# Patient Record
Sex: MALE | Race: WHITE | NOT HISPANIC OR LATINO | Employment: OTHER | URBAN - METROPOLITAN AREA
[De-identification: names, ages, dates, MRNs, and addresses within clinical notes are randomized per-mention and may not be internally consistent; named-entity substitution may affect disease eponyms.]

---

## 2023-04-24 ENCOUNTER — HOSPITAL ENCOUNTER (OUTPATIENT)
Dept: DATA CONVERSION | Facility: HOSPITAL | Age: 53
End: 2023-04-24
Attending: ANESTHESIOLOGY
Payer: COMMERCIAL

## 2023-04-24 DIAGNOSIS — I10 ESSENTIAL (PRIMARY) HYPERTENSION: ICD-10-CM

## 2023-04-24 DIAGNOSIS — Z96.651 PRESENCE OF RIGHT ARTIFICIAL KNEE JOINT: ICD-10-CM

## 2023-04-24 DIAGNOSIS — M54.16 RADICULOPATHY, LUMBAR REGION: ICD-10-CM

## 2023-04-24 DIAGNOSIS — Z90.49 ACQUIRED ABSENCE OF OTHER SPECIFIED PARTS OF DIGESTIVE TRACT: ICD-10-CM

## 2023-04-24 DIAGNOSIS — Z96.642 PRESENCE OF LEFT ARTIFICIAL HIP JOINT: ICD-10-CM

## 2023-04-24 DIAGNOSIS — M48.061 SPINAL STENOSIS, LUMBAR REGION WITHOUT NEUROGENIC CLAUDICATION: ICD-10-CM

## 2023-05-08 ENCOUNTER — HOSPITAL ENCOUNTER (OUTPATIENT)
Dept: DATA CONVERSION | Facility: HOSPITAL | Age: 53
End: 2023-05-08
Attending: ANESTHESIOLOGY | Admitting: ANESTHESIOLOGY
Payer: COMMERCIAL

## 2023-05-08 DIAGNOSIS — M16.11 UNILATERAL PRIMARY OSTEOARTHRITIS, RIGHT HIP: ICD-10-CM

## 2023-05-08 DIAGNOSIS — Z90.49 ACQUIRED ABSENCE OF OTHER SPECIFIED PARTS OF DIGESTIVE TRACT: ICD-10-CM

## 2023-05-08 DIAGNOSIS — M25.551 PAIN IN RIGHT HIP: ICD-10-CM

## 2023-05-08 DIAGNOSIS — Z96.642 PRESENCE OF LEFT ARTIFICIAL HIP JOINT: ICD-10-CM

## 2023-05-08 DIAGNOSIS — I10 ESSENTIAL (PRIMARY) HYPERTENSION: ICD-10-CM

## 2023-05-08 DIAGNOSIS — Z96.651 PRESENCE OF RIGHT ARTIFICIAL KNEE JOINT: ICD-10-CM

## 2023-09-01 ENCOUNTER — OFFICE VISIT (OUTPATIENT)
Dept: PRIMARY CARE | Facility: CLINIC | Age: 53
End: 2023-09-01
Payer: COMMERCIAL

## 2023-09-01 VITALS
HEART RATE: 69 BPM | WEIGHT: 315 LBS | SYSTOLIC BLOOD PRESSURE: 112 MMHG | BODY MASS INDEX: 45.1 KG/M2 | HEIGHT: 70 IN | DIASTOLIC BLOOD PRESSURE: 75 MMHG

## 2023-09-01 DIAGNOSIS — M25.521 RIGHT ELBOW PAIN: Primary | ICD-10-CM

## 2023-09-01 DIAGNOSIS — Z13.220 LIPID SCREENING: ICD-10-CM

## 2023-09-01 DIAGNOSIS — E55.9 VITAMIN D DEFICIENCY: ICD-10-CM

## 2023-09-01 DIAGNOSIS — E66.01 CLASS 3 SEVERE OBESITY WITH BODY MASS INDEX (BMI) OF 45.0 TO 49.9 IN ADULT, UNSPECIFIED OBESITY TYPE, UNSPECIFIED WHETHER SERIOUS COMORBIDITY PRESENT (MULTI): ICD-10-CM

## 2023-09-01 LAB
ALANINE AMINOTRANSFERASE (SGPT) (U/L) IN SER/PLAS: 40 U/L (ref 10–52)
ALBUMIN (G/DL) IN SER/PLAS: 4.1 G/DL (ref 3.4–5)
ALKALINE PHOSPHATASE (U/L) IN SER/PLAS: 140 U/L (ref 33–120)
ANION GAP IN SER/PLAS: 14 MMOL/L (ref 10–20)
ASPARTATE AMINOTRANSFERASE (SGOT) (U/L) IN SER/PLAS: 31 U/L (ref 9–39)
BILIRUBIN TOTAL (MG/DL) IN SER/PLAS: 0.5 MG/DL (ref 0–1.2)
CALCIUM (MG/DL) IN SER/PLAS: 9.4 MG/DL (ref 8.6–10.6)
CARBON DIOXIDE, TOTAL (MMOL/L) IN SER/PLAS: 24 MMOL/L (ref 21–32)
CHLORIDE (MMOL/L) IN SER/PLAS: 107 MMOL/L (ref 98–107)
CHOLESTEROL (MG/DL) IN SER/PLAS: 134 MG/DL (ref 0–199)
CHOLESTEROL IN HDL (MG/DL) IN SER/PLAS: 42.6 MG/DL
CHOLESTEROL/HDL RATIO: 3.1
CREATININE (MG/DL) IN SER/PLAS: 0.86 MG/DL (ref 0.5–1.3)
ERYTHROCYTE DISTRIBUTION WIDTH (RATIO) BY AUTOMATED COUNT: 13.3 % (ref 11.5–14.5)
ERYTHROCYTE MEAN CORPUSCULAR HEMOGLOBIN CONCENTRATION (G/DL) BY AUTOMATED: 31 G/DL (ref 32–36)
ERYTHROCYTE MEAN CORPUSCULAR VOLUME (FL) BY AUTOMATED COUNT: 99 FL (ref 80–100)
ERYTHROCYTES (10*6/UL) IN BLOOD BY AUTOMATED COUNT: 4.31 X10E12/L (ref 4.5–5.9)
ESTIMATED AVERAGE GLUCOSE FOR HBA1C: 108 MG/DL
GFR MALE: >90 ML/MIN/1.73M2
GLUCOSE (MG/DL) IN SER/PLAS: 95 MG/DL (ref 74–99)
HEMATOCRIT (%) IN BLOOD BY AUTOMATED COUNT: 42.6 % (ref 41–52)
HEMOGLOBIN (G/DL) IN BLOOD: 13.2 G/DL (ref 13.5–17.5)
HEMOGLOBIN A1C/HEMOGLOBIN TOTAL IN BLOOD: 5.4 %
LDL: 74 MG/DL (ref 0–99)
LEUKOCYTES (10*3/UL) IN BLOOD BY AUTOMATED COUNT: 6 X10E9/L (ref 4.4–11.3)
NRBC (PER 100 WBCS) BY AUTOMATED COUNT: 0 /100 WBC (ref 0–0)
PLATELETS (10*3/UL) IN BLOOD AUTOMATED COUNT: 241 X10E9/L (ref 150–450)
POTASSIUM (MMOL/L) IN SER/PLAS: 4.1 MMOL/L (ref 3.5–5.3)
PROTEIN TOTAL: 7.1 G/DL (ref 6.4–8.2)
SODIUM (MMOL/L) IN SER/PLAS: 141 MMOL/L (ref 136–145)
TRIGLYCERIDE (MG/DL) IN SER/PLAS: 87 MG/DL (ref 0–149)
URATE (MG/DL) IN SER/PLAS: 7.3 MG/DL (ref 4–7.5)
UREA NITROGEN (MG/DL) IN SER/PLAS: 13 MG/DL (ref 6–23)
VLDL: 17 MG/DL (ref 0–40)

## 2023-09-01 PROCEDURE — 80061 LIPID PANEL: CPT

## 2023-09-01 PROCEDURE — 80053 COMPREHEN METABOLIC PANEL: CPT

## 2023-09-01 PROCEDURE — 84550 ASSAY OF BLOOD/URIC ACID: CPT

## 2023-09-01 PROCEDURE — 3008F BODY MASS INDEX DOCD: CPT | Performed by: STUDENT IN AN ORGANIZED HEALTH CARE EDUCATION/TRAINING PROGRAM

## 2023-09-01 PROCEDURE — 85027 COMPLETE CBC AUTOMATED: CPT

## 2023-09-01 PROCEDURE — 99214 OFFICE O/P EST MOD 30 MIN: CPT | Performed by: STUDENT IN AN ORGANIZED HEALTH CARE EDUCATION/TRAINING PROGRAM

## 2023-09-01 PROCEDURE — 83036 HEMOGLOBIN GLYCOSYLATED A1C: CPT

## 2023-09-01 PROCEDURE — 82306 VITAMIN D 25 HYDROXY: CPT

## 2023-09-01 PROCEDURE — 1036F TOBACCO NON-USER: CPT | Performed by: STUDENT IN AN ORGANIZED HEALTH CARE EDUCATION/TRAINING PROGRAM

## 2023-09-01 PROCEDURE — 3074F SYST BP LT 130 MM HG: CPT | Performed by: STUDENT IN AN ORGANIZED HEALTH CARE EDUCATION/TRAINING PROGRAM

## 2023-09-01 PROCEDURE — 3078F DIAST BP <80 MM HG: CPT | Performed by: STUDENT IN AN ORGANIZED HEALTH CARE EDUCATION/TRAINING PROGRAM

## 2023-09-01 RX ORDER — METHYLPREDNISOLONE 4 MG/1
TABLET ORAL
Qty: 21 TABLET | Refills: 0 | Status: SHIPPED
Start: 2023-09-01 | End: 2023-09-01 | Stop reason: ENTERED-IN-ERROR

## 2023-09-01 NOTE — PROGRESS NOTES
"Subjective   Patient ID: Surendra Maher is a 53 y.o. male who presents for Arm Pain.  Pt stated that last Friday night he noticed that his elbow was swollen and tender but does not remember it being warm to the touch. tates that on Friday the pain was shooting up from his hand up into his elbow. The pain was so severe that he was not able to sleep at all on Friday. He also noticed that he did not have any strength in that arm. Saturday it continued to be swollen and after taking ibuprofen the pain improved. Denies any recent trauma. Denies fever and chills. On Sunday the patient went to [place] and denies any x-rays being done and was never given any prescription.  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   Pt has a PMH of Bilateral Carpal tunnel surgery with Dr. Howe 17 years ago.      Denies new onset headaches, fever, chills, n/v/d, chest pain, SOB, abdominal pain, urinary symptoms, and lower extremity edema.     Review of Systems  All other systems have been reviewed and are negative.    Visit Vitals  /75   Pulse 69   Ht 1.778 m (5' 10\")   Wt (!) 151 kg (332 lb)   BMI 47.64 kg/m²   Smoking Status Never   BSA 2.73 m²       Objective   Physical Exam  General: Alert and oriented. Appears well-nourished and in no acute distress.  Eyes: PERRLA. EOMI.  Head/neck: Normocephalic. Supple.  Lymphatics: No cervical lymphadenopathy.  Respiratory/Thorax: Clear to auscultation bilaterally. No wheezing.   Cardiovascular: Regular rate and rhythm. No murmurs.  Gastrointestinal: Soft, nontender, nondistended. +BS   Musculoskeletal: ROM intact. No joint swelling. Normal strength   Extremities: Warm and well perfused. No peripheral edema.  Neurological: No gross neurologic deficits.   Psychological: Appropriate mood and affect.   Skin: No visible rashes or lesions. "     Assessment/Plan   Pt is a 54 yo M who is presenting to the office right elbow pain. This is most likely cubital tunnel syndrome. Right elbow x-ray was ordered. Orthopaedic surgery referral was placed. Due to hx of Gout, Uric acid lab was drawn in clinic. Other annual screening labs were collected in clinic. Will follow up with patient about lab results.      No red flags. Follow up in  1 month for annual physical.    Problem List Items Addressed This Visit    None  Visit Diagnoses       Right elbow pain    -  Primary    Relevant Orders    XR elbow right T 3+ views    Referral to Orthopaedic Surgery    Uric acid    Vitamin D deficiency        Relevant Orders    Vitamin D 25-Hydroxy,Total (for eval of Vitamin D levels)    Class 3 severe obesity with body mass index (BMI) of 45.0 to 49.9 in adult, unspecified obesity type, unspecified whether serious comorbidity present (CMS/[facility])        Relevant Orders    CBC    Comprehensive Metabolic Panel    Hemoglobin A1C    Lipid screening        Relevant Orders    Lipid Panel            I have personally reviewed all available pertinent labs, imaging, and consult notes with the patient.     All questions and concerns were addressed. Patient verbalizes understanding instructions and agrees with established plan of care.     I discussed the plan with Dr.Zen Marium Vasquez DO, PGY-2  [facility]

## 2023-09-02 LAB — CALCIDIOL (25 OH VITAMIN D3) (NG/ML) IN SER/PLAS: 36 NG/ML

## 2023-09-07 PROBLEM — R53.83 FATIGUE: Status: ACTIVE | Noted: 2023-09-07

## 2023-09-07 PROBLEM — N52.9 ED (ERECTILE DYSFUNCTION): Status: ACTIVE | Noted: 2023-09-07

## 2023-09-07 PROBLEM — K21.9 GASTROESOPHAGEAL REFLUX DISEASE WITHOUT ESOPHAGITIS: Status: ACTIVE | Noted: 2018-06-13

## 2023-09-07 PROBLEM — G89.29 CHRONIC LOW BACK PAIN: Status: ACTIVE | Noted: 2023-09-07

## 2023-09-07 PROBLEM — E78.00 ELEVATED LDL CHOLESTEROL LEVEL: Status: ACTIVE | Noted: 2023-09-07

## 2023-09-07 PROBLEM — Z96.642 STATUS POST LEFT HIP REPLACEMENT: Status: ACTIVE | Noted: 2023-09-07

## 2023-09-07 PROBLEM — R10.13 EPIGASTRIC PAIN: Status: ACTIVE | Noted: 2018-06-13

## 2023-09-07 PROBLEM — M54.50 CHRONIC LOW BACK PAIN: Status: ACTIVE | Noted: 2023-09-07

## 2023-09-07 PROBLEM — R91.1 INCIDENTAL LUNG NODULE: Status: ACTIVE | Noted: 2023-09-07

## 2023-09-07 PROBLEM — R39.11 URINARY HESITANCY: Status: ACTIVE | Noted: 2023-09-07

## 2023-09-07 PROBLEM — D75.89 MACROCYTOSIS WITHOUT ANEMIA: Status: ACTIVE | Noted: 2023-09-07

## 2023-09-07 PROBLEM — M16.12 PRIMARY LOCALIZED OSTEOARTHROSIS OF LEFT HIP: Status: ACTIVE | Noted: 2023-09-07

## 2023-09-07 PROBLEM — I10 BENIGN ESSENTIAL HTN: Status: ACTIVE | Noted: 2023-09-07

## 2023-09-07 PROBLEM — R42 POSITIONAL LIGHTHEADEDNESS: Status: ACTIVE | Noted: 2023-09-07

## 2023-09-07 PROBLEM — M48.00 SPINAL STENOSIS: Status: ACTIVE | Noted: 2023-09-07

## 2023-09-07 PROBLEM — I71.20 THORACIC AORTIC ANEURYSM WITHOUT RUPTURE (CMS-HCC): Status: ACTIVE | Noted: 2023-09-07

## 2023-09-07 PROBLEM — R93.1 ELEVATED CORONARY ARTERY CALCIUM SCORE: Status: ACTIVE | Noted: 2023-09-07

## 2023-09-07 PROBLEM — M25.559 CHRONIC HIP PAIN: Status: ACTIVE | Noted: 2023-09-07

## 2023-09-07 PROBLEM — R79.89 LOW VITAMIN D LEVEL: Status: ACTIVE | Noted: 2023-09-07

## 2023-09-07 PROBLEM — K80.20 CHOLELITHIASIS: Status: ACTIVE | Noted: 2018-06-13

## 2023-09-07 PROBLEM — M17.11 PRIMARY OSTEOARTHRITIS OF RIGHT KNEE: Status: ACTIVE | Noted: 2023-09-07

## 2023-09-07 PROBLEM — G89.29 CHRONIC HIP PAIN: Status: ACTIVE | Noted: 2023-09-07

## 2023-09-07 PROBLEM — M54.16 LUMBAR RADICULOPATHY: Status: ACTIVE | Noted: 2020-06-02

## 2023-09-07 RX ORDER — CHOLECALCIFEROL (VITAMIN D3) 25 MCG
1 TABLET ORAL DAILY
COMMUNITY
Start: 2021-02-04 | End: 2024-02-15 | Stop reason: SDUPTHER

## 2023-09-07 RX ORDER — LISINOPRIL 20 MG/1
1 TABLET ORAL DAILY
COMMUNITY
Start: 2021-09-30 | End: 2023-10-26 | Stop reason: SDUPTHER

## 2023-09-07 RX ORDER — MELOXICAM 15 MG/1
TABLET ORAL
COMMUNITY
Start: 2023-07-10 | End: 2024-02-02

## 2023-09-07 RX ORDER — FOLIC ACID 0.4 MG
1 TABLET ORAL DAILY
COMMUNITY
Start: 2021-02-04

## 2023-09-07 RX ORDER — METHOCARBAMOL 750 MG/1
TABLET, FILM COATED ORAL 4 TIMES DAILY
COMMUNITY
Start: 2022-07-06 | End: 2023-10-12 | Stop reason: ALTCHOICE

## 2023-09-07 RX ORDER — ATORVASTATIN CALCIUM 40 MG/1
1 TABLET, FILM COATED ORAL NIGHTLY
COMMUNITY
Start: 2021-02-09 | End: 2023-10-26 | Stop reason: SDUPTHER

## 2023-09-07 RX ORDER — TOPIRAMATE 100 MG/1
1 TABLET, FILM COATED ORAL 2 TIMES DAILY
COMMUNITY
Start: 2018-05-07 | End: 2023-10-26 | Stop reason: SDUPTHER

## 2023-09-07 RX ORDER — OMEPRAZOLE 40 MG/1
1 CAPSULE, DELAYED RELEASE ORAL DAILY
COMMUNITY
Start: 2021-02-02 | End: 2023-10-26 | Stop reason: SDUPTHER

## 2023-09-08 PROBLEM — Z87.898 HISTORY OF DYSURIA: Status: ACTIVE | Noted: 2023-09-08

## 2023-09-08 PROBLEM — Z86.69 OTITIS MEDIA RESOLVED: Status: ACTIVE | Noted: 2023-09-08

## 2023-09-08 PROBLEM — Z87.09: Status: ACTIVE | Noted: 2023-09-08

## 2023-09-08 PROBLEM — M54.12 CERVICAL RADICULITIS: Status: ACTIVE | Noted: 2023-09-08

## 2023-09-08 PROBLEM — R14.0 ABDOMINAL BLOATING: Status: ACTIVE | Noted: 2023-09-08

## 2023-10-02 NOTE — OP NOTE
Post Operative Note:     Post-Procedure Diagnosis: Lumbar radiculitis, stenosis   Procedure: 1.   2.   3.   4.   5.   Surgeon: Bk   Resident/Fellow/Other Assistant: William   Estimated Blood Loss (mL): none   Specimen: no   Findings: None     Operative Report Dictated:  Dictation: not applicable - note contains Operative  Report   Operative Report:    Preoperative diagnosis:  Lumbar radiculitis  Postoperative diagnosis:  Lumbar radiculitis, stenosis  Procedure: L4/5 Lumbar epidural steroid injection under fluoroscopic guidance  Surgeon: Elisa Bourgeois  Assistant:  Fellow  Anesthesia: Local  Complications: Apparently none    Clinical note: Surendra is a 53-year-old male with a history of back and leg symptoms.  He also has significant degeneration in his hip.  He typically gets an injection 1 time a year which gives  him long-lasting relief.  His last injection was in January 2022.  He is here today for repeat procedure.    Procedure note: The patient was met in the preoperative holding area after risks benefits and alternatives to procedure were discussed with the patient, informed consent was obtained. Patient brought back to the procedure room and placed in the prone  position on the fluoroscopy table. Area over the back was exposed, prepped, draped, in the usual sterile fashion.  Skin and subcutaneous tissues to the lumbar intralaminar space was anesthetized using 0.5% lidocaine.  A  3.5 inch 18-gauge Tuohy needle was inserted in the skin and advanced into the interlaminar space. A glass syringe was used to achieve the epidural space using the loss resistance technique.  Contrast was injected which showed appropriate epidural spread, no intravascular or intrathecal uptake.  Contrast was confirmed in both AP and lateral views . A total of 4 mL's of 0.5% lidocaine mixed with 40 mg methylprednisolone was injected. Needle removed, bandage applied, patient tolerated the procedure well with no immediate  complications.      Attestation:   Note Completion:  Attending Attestation I was present for the entire procedure         Electronic Signatures:  Elisa Bourgeois)  (Signed 24-Apr-2023 09:09)   Authored: Post Operative Note, Note Completion      Last Updated: 24-Apr-2023 09:09 by Elisa Bourgeois)

## 2023-10-02 NOTE — OP NOTE
Post Operative Note:     Post-Procedure Diagnosis: Right sided hip pain, known  osteoarthritis   Procedure: 1.   2.   3.   4.   5.   Surgeon: Bk   Resident/Fellow/Other Assistant: Marlee   Estimated Blood Loss (mL): none   Specimen: no   Findings: None     Operative Report Dictated:  Dictation: not applicable - note contains Operative  Report   Operative Report:    Preoperative diagnosis:  Hip pain/OA  Postoperative diagnosis: Hip pain/OA  Procedure: Right sided intra- articular hip joint injection under fluoroscopic guidance  Surgeon: Elisa Bourgeois  Assistant:  Fellow  Anesthesia: Local  Complications: Apparently none    Clinical note: Mr Maher is a 53-year-old male with a history of right-sided hip pain.  Patient is here today for the aforementioned procedure.    Procedure note: The patient was met in the preoperative holding area after risks benefits and alternatives to procedure were discussed with the patient, informed consent was obtained. Patient brought back to the procedure room and placed in the lateral  decubitus position on the fluoroscopy table. Area over the hip joint was exposed, prepped, draped, in the usual sterile fashion.  Skin and subcutaneous tissues to the joint was anesthetized using 0.5% lidocaine.  23-gauge spinal needle was inserted in  the skin and advanced.  Contrast was injected which showed appropriate spread, no intravascular uptake. A total of 3 mL of bupivacaine mixed with 40 mg of methylprednisolone. Needle removed, bandage applied, patient tolerated the procedure well with no  immediate complications.      Attestation:   Note Completion:  Attending Attestation I was present for the entire procedure         Electronic Signatures:  Elisa Bourgeois)  (Signed 08-May-2023 08:33)   Authored: Post Operative Note, Note Completion      Last Updated: 08-May-2023 08:33 by Elisa Bourgeois)

## 2023-10-04 DIAGNOSIS — G56.21 CUBITAL TUNNEL SYNDROME ON RIGHT: ICD-10-CM

## 2023-10-04 NOTE — H&P (VIEW-ONLY)
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 The patient feels that they have maximized the benefits of conservative care, and they do want to go on to surgery.    I reviewed the major risks of surgery including infection; scarring; damage to nerves, tendons, or vessels; stiffness; failure to relieve symptoms, recurrent symptoms, nerve instability, and wound healing problems, as well as anesthesia risks. I answered their questions to their satisfaction. They were given my contact information and will contact the office when they are ready to schedule an exact surgical date. Surgery will be posted as follows :    Dx : Right cubital tunnel syndrome  ICD-10 : G56.22  Procedure : Right ulnar nerve decompression at elbow with possible transposition  CPT : 08947  Anesth : MAC sedation plus local  Location : [facility] (BMI-44)  Duration : 90 minutes  Specials :  PAT : No  Post-Op Visit : 10-15 days      Chief Complaint    Patient is here today for elbow pain.YA      History of Present Illness    ASSESSMENT + PLAN :    Right cubital tunnel syndrome with early clawing    The nature of cubital tunnel syndrome was reviewed, along with the slowly progressive natural history. The options for management were reviewed, including night splinting or surgical cubital tunnel release. The major benefits and risks of surgery were specifically reviewed, as was the postoperative rehabilitation course. The possible need for formal anterior nerve transposition and postoperative immobilization was also discussed.    The patient does want to go ahead with surgery and will contact the office to schedule an exact surgical date. The procedure will be done under sedation and local at the location of his  convenience.        Hypertrophic osteoarthritis right elbow with loose body    I reviewed the progressive nature of this condition. As there is currently no mid arc locking, there is no urgent need for excision of the loose body. I discussed symptomatic management of the arthritis with Tylenol or anti-inflammatory along with ice or heat and the occasional compressive wrap as needed. I reviewed more invasive procedures including cortisone injection, cleanout of the bone spurs, or even total elbow replacement. I reviewed the major risks and benefits of each of these and we agreed on continued conservative management for now. I reviewed that the ulnar nerve surgery at the elbow will not change the arthritis of the elbow for better or for worse.    Follow-up with any additional concerns.    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 Barton. He has had intermittent pain and swelling of the right elbow and significant pain, numbness, and tingling into the right small and ring fingers. These have both occurred episodically over the last 6 weeks. The swelling has been decreasing but the tingling has been getting worse over the last couple of weeks. It wakes him at night with a positive shake sign. He is now having some difficulty with fine manipulation with that hand. No similar problems on the left. No treatment so far beyond the occasional anti-inflammatory.    He is not diabetic or hypothyroid. He does not smoke. He does not drink during the week but will have 15-30 beers on the weekend.      Review of Systems  A 30-item multi-system Review Of Systems was obtained on today's intake form. This was reviewed with the patient and is correct. The pertinent positives and negatives are listed above. The form has been scanned separately into the medical record.      Active  Problems  Problems    · Annual physical exam (V70.0) (Z00.00)   · Ascending aortic aneurysm (441.2) (I71.21)   · Belching (787.3) (R14.2)   · Benign essential HTN (401.1) (I10)   · Chronic back pain greater than 3 months duration (724.5,338.29) (M54.9,G89.29)   · Chronic hip pain (719.45,338.29) (M25.559,G89.29)   · Chronic low back pain (724.2,338.29) (M54.50,G89.29)   · Chronic pain of right hip (719.45,338.29) (M25.551,G89.29)   · Class 3 severe obesity with body mass index (BMI) of 40.0 to 44.9 in adult, unspecified  obesity type, unspecified whether serious comorbidity present (278.01,V85.41)  (E66.01,Z68.41)   · ED (erectile dysfunction) (607.84) (N52.9)   · Elevated coronary artery calcium score (414.00) (R93.1)   · Elevated LDL cholesterol level (272.0) (E78.00)   · Encounter for medication review and counseling (V65.49) (Z71.89)   · Encounter for vaccination (V05.9) (Z23)   · Fatigue (780.79) (R53.83)   · Gastroesophageal reflux disease without esophagitis (530.81) (K21.9)   · Hip pain, right (719.45) (M25.551)   · Incidental lung nodule (793.11) (R91.1)   · Knee pain (719.46) (M25.569)   · bilateral   · Low vitamin D level (790.6) (R79.89)   · Lumbar radiculopathy (724.4) (M54.16)   · Lung nodule seen on imaging study (793.11) (R91.1)   · Macrocytosis without anemia (289.89) (D75.89)   · Morbid obesity (278.01) (E66.01)   · Morbid obesity, unspecified obesity type (278.01) (E66.01)   · Needs flu shot (V04.81) (Z23)   · Positional lightheadedness (780.4) (R42)   · Primary localized osteoarthrosis of left hip (715.15) (M16.12)   · Primary osteoarthritis of right hip (715.15) (M16.11)   · Primary osteoarthritis of right knee (715.16) (M17.11)   · Right elbow pain (719.42) (M25.521)   · Shortness of breath (786.05) (R06.02)   · Shoulder injury, right, initial encounter (959.2) (S49.91XA)   · Spinal stenosis (724.00) (M48.00)   · Status post left hip replacement (V43.64) (Z96.642)   · Thoracic aortic aneurysm  without rupture (441.2) (I71.20)   · Urinary hesitancy (788.64) (R39.11)    Past Medical History  Problems    · History of Aftercare following right knee joint replacement surgery (V54.81,V43.65)  (Z47.1,Z96.651)   · Resolved Date: 07 Oct 2021   · History of Back problem (724.9) (M53.9)   · History of Bad odor of urine (791.9) (R82.90)   · Resolved Date: 07 Oct 2021   · History of Bloating (787.3) (R14.0)   · Resolved Date: 07 Oct 2021   · History of Cervical radiculitis (723.4) (M54.12)   · left side   · History of Degenerative disc disease, cervical (722.4) (M50.30)   · History of Hip pain, left (719.45) (M25.552)   · History of allergic rhinitis (V12.69) (Z87.09)   · History of arthritis (V13.4) (Z87.39)   · History of dysuria (V13.00) (Z87.898)   · Resolved Date: 07 Oct 2021   · History of esophageal reflux (V12.79) (Z87.19)   · History of hypertension (V12.59) (Z86.79)   · History of otitis media (V12.49) (Z86.69)   · History of sinusitis (V12.69) (Z87.09)   · History of Preoperative clearance (V72.84) (Z01.818)   · Resolved Date: 07 Oct 2021   · History of Screening for colon cancer (V76.51) (Z12.11)   · Resolved Date: 07 Oct 2021   · History of Screening for lipid disorders (V77.91) (Z13.220)   · Resolved Date: 07 Oct 2021    Surgical History  Problems    · History of Appendectomy   · History of Gallbladder Surgery   · april 2015   · History of Hip replacement   · History of Knee replacement   · History of Lower Back Surgery   · L-4 AND L-5   · History of Wrist Surgery   · BILATERAL CARPAL TUNNEL REPAIR.    Family History  Mother    · Family history of cardiac disorder (V17.49) (Z82.49)   · Family history of diabetes mellitus (V18.0) (Z83.3)  Father    · Family history of Coronary artery disease involving coronary bypass graft of native heart  with angina pectoris   · Family history of arthritis (V17.7) (Z82.61)   · Family history of cardiac disorder (V17.49) (Z82.49)   · Family history of diabetes mellitus  (V18.0) (Z83.3)  Maternal Grandmother    · Family history of cardiac disorder (V17.49) (Z82.49)   · Family history of diabetes mellitus (V18.0) (Z83.3)  Paternal Grandmother    · Family history of arthritis (V17.7) (Z82.61)   · Family history of cardiac disorder (V17.49) (Z82.49)   · Family history of diabetes mellitus (V18.0) (Z83.3)  Maternal Grandfather    · Family history of cardiac disorder (V17.49) (Z82.49)   · Family history of diabetes mellitus (V18.0) (Z83.3)  Paternal Grandfather    · Family history of arthritis (V17.7) (Z82.61)   · Family history of cardiac disorder (V17.49) (Z82.49)   · Family history of diabetes mellitus (V18.0) (Z83.3)    Social History  Problems    · Does not use smokeless tobacco (V49.89) (Z78.9)   · Exercises moderately 3 or more times a week   · Former smoker (V15.82) (Z87.891)   · quit may 2014   · Occasional alcohol use    Allergies     · No Known Drug Allergies   Recorded By: Marah Basurto; 5/19/2015 11:46:40 AM    Current Meds    Medication Name Instruction   Atorvastatin Calcium 40 MG Oral Tablet TAKE 1 TABLET AT BEDTIME.   Folate 400 MCG Oral Tablet TAKE 1 TABLET DAILY AS DIRECTED.   Lisinopril 20 MG Oral Tablet TAKE 1 TABLET DAILY AS DIRECTED.   Meloxicam 15 MG Oral Tablet TAKE 1 TABLET DAILY.   Omeprazole 40 MG Oral Capsule Delayed Release TAKE ONE CAPSULE BY MOUTH EVERY DAY   Shingrix 50 MCG/0.5ML Intramuscular Suspension Reconstituted 1 injection IM   Topiramate 100 MG Oral Tablet Take one tablet by mouth twice a day   Vitamin D 25 MCG (1000 UT) Oral Tablet TAKE 1 TABLET DAILY.     Vitals  Vital Signs    Recorded: [id number] 09:14AM   Height 5 ft 11 in   Weight 318 lb    BMI Calculated 44.35 kg/m2   BSA Calculated 2.57   Tobacco Use b) No   Falls Screening (Age 18+) a) No falls within the last year     Physical Exam  Constitutional: Appears stated age. Well-developed and well-nourished morbidly obese male in no acute distress.  Psychiatric: Pleasant normal mood and affect.  Behavior is appropriate for the situation.   Head:  Normocephalic and atraumatic.  Eyes:  Pupils are equal and round.  Cardiovascular: 2+ radial and ulnar pulses. Fingers well-perfused.  Respiratory: Effort normal. No respiratory distress. Speaking in complete sentences.  Neurologic: Alert and oriented to person, place, and time.  Skin: Skin is intact, warm and dry.  Hematologic / Lymphatic: No lymphedema or lymphangitis.    Extremities / Musculoskeletal:   Skin of the right hand, forearm, elbow is intact with no erythema, ecchymosis, diffuse swelling. Normal skin drag and coloration. Full composite finger flexion extension but there is some mild pseudo claw of ring and small with full composite extension. This does reverse with Pamela maneuver. No significant Wartenberg or Froment sign. 5/5 APB and hand intrinsics with no obvious wasting, though he has a fairly large hand. Supination 60 and pronation 80 degrees. Elbow hinge motion 30 to 125 degrees versus 5-1 35 on the left. No mid arc pain or crepitus. Endpoints are firm and a little painful. There is a trace effusion in the soft spot. Collateral ligament exam is stable. No epicondylitis signs. Negative Tinel, Phalen, Durkan at the wrist. Positive Tinel at the elbow with a positive elbow flexion test reproducing that portion of the chief complaint. Cervical range of motion is good and does not cause any discomfort down the arm. Sensation intact to light touch in all distributions. Capillary refill less than 2 seconds.      Results/Data  X-rays right elbow from [facility] on September 5 were independently interpreted by me today and show mild osteoarthritic narrowing with significant spur formation both anterior and posterior as well as at the radial neck. Large anterior loose body. Moderate effusion. No fractures.      Signatures   Electronically signed by : CATHLEEN Boyer MD; Oct  1 2023  6:28AM EST (Author)

## 2023-10-08 PROBLEM — G56.21 CUBITAL TUNNEL SYNDROME ON RIGHT: Status: ACTIVE | Noted: 2023-10-04

## 2023-10-12 ENCOUNTER — CLINICAL SUPPORT (OUTPATIENT)
Dept: PREADMISSION TESTING | Facility: HOSPITAL | Age: 53
End: 2023-10-12
Payer: COMMERCIAL

## 2023-10-12 VITALS
HEIGHT: 70 IN | HEART RATE: 74 BPM | SYSTOLIC BLOOD PRESSURE: 116 MMHG | BODY MASS INDEX: 45.1 KG/M2 | OXYGEN SATURATION: 96 % | RESPIRATION RATE: 16 BRPM | WEIGHT: 315 LBS | DIASTOLIC BLOOD PRESSURE: 81 MMHG | TEMPERATURE: 97.3 F

## 2023-10-12 DIAGNOSIS — Z01.818 ENCOUNTER FOR PREADMISSION TESTING: Primary | ICD-10-CM

## 2023-10-12 RX ORDER — IBUPROFEN 200 MG
600 TABLET ORAL 2 TIMES DAILY PRN
COMMUNITY
End: 2024-02-15 | Stop reason: ALTCHOICE

## 2023-10-12 ASSESSMENT — ENCOUNTER SYMPTOMS
HEMATOLOGIC/LYMPHATIC NEGATIVE: 1
MUSCULOSKELETAL NEGATIVE: 1
CONSTITUTIONAL NEGATIVE: 1
RESPIRATORY NEGATIVE: 1
SHORTNESS OF BREATH: 0
GASTROINTESTINAL NEGATIVE: 1
NEUROLOGICAL NEGATIVE: 1
CARDIOVASCULAR NEGATIVE: 1

## 2023-10-12 ASSESSMENT — PAIN SCALES - GENERAL: PAINLEVEL_OUTOF10: 8

## 2023-10-12 ASSESSMENT — PAIN - FUNCTIONAL ASSESSMENT: PAIN_FUNCTIONAL_ASSESSMENT: 0-10

## 2023-10-12 NOTE — CPM/PAT H&P
CPM/PAT Evaluation     Surendra Maher is a 53 y.o. male     Chief Complaint: Cubital tunnel syndrome on right     HPI:  Pt is a 53 year old male who presents to [facility] with complaints of right elbow pain.  He currently rates his pain a 8/10. He describes it as a constant numbness, dull and sharp pain. Nothing helps to alleviate it. He is scheduled for a right ulnar nerve decompression at elbow with possible transposition on 10/16/23.        Past Medical History:   Diagnosis Date    Abdominal distension (gaseous) 02/02/2021    Bloating    Aftercare following joint replacement surgery 02/02/2021    Aftercare following right knee joint replacement surgery    Dorsopathy, unspecified     Back problem    Encounter for other preprocedural examination 05/15/2020    Preoperative clearance    Encounter for screening for lipoid disorders 02/02/2021    Screening for lipid disorders    Encounter for screening for malignant neoplasm of colon 02/02/2021    Screening for colon cancer    Other cervical disc degeneration, unspecified cervical region     Degenerative disc disease, cervical    Pain in left hip     Hip pain, left    Pain in right hip     Hip pain, right    Personal history of other diseases of the circulatory system     History of hypertension    Personal history of other diseases of the digestive system     History of esophageal reflux    Personal history of other diseases of the musculoskeletal system and connective tissue     History of arthritis    Personal history of other diseases of the nervous system and sense organs     History of otitis media    Personal history of other diseases of the respiratory system     History of sinusitis    Personal history of other diseases of the respiratory system     History of allergic rhinitis    Personal history of other specified conditions 02/02/2021    History of dysuria    Radiculopathy, cervical region     Cervical radiculitis    Unspecified abnormal findings in urine  02/02/2021    Bad odor of urine      Past Surgical History:   Procedure Laterality Date    APPENDECTOMY  05/19/2015    Appendectomy    BACK SURGERY  05/19/2015    Lower Back Surgery    CHOLECYSTECTOMY      GALLBLADDER SURGERY  05/19/2015    Gallbladder Surgery    OTHER SURGICAL HISTORY  05/19/2015    Wrist Surgery    OTHER SURGICAL HISTORY  11/09/2022    Hip replacement    OTHER SURGICAL HISTORY  11/09/2022    Knee replacement        Allergies   Allergen Reactions    Neosporin (Neomycin-Polymyx) Unknown    Pollen Extracts Unknown     Runny nose        Current Outpatient Medications on File Prior to Visit   Medication Sig Dispense Refill    atorvastatin (Lipitor) 40 mg tablet Take 1 tablet (40 mg) by mouth once daily at bedtime.      cholecalciferol (Vitamin D-3) 25 MCG (1000 UT) tablet Take 1 tablet (25 mcg) by mouth once daily.      folic acid (Folvite) 400 mcg tablet Take 1 tablet (0.4 mg) by mouth once daily.      lisinopril 20 mg tablet Take 1 tablet (20 mg) by mouth once daily.      meloxicam (Mobic) 15 mg tablet       omeprazole (PriLOSEC) 40 mg DR capsule Take 1 capsule (40 mg) by mouth once daily.      topiramate (Topamax) 100 mg tablet Take 1 tablet (100 mg) by mouth 2 times a day.      ibuprofen 200 mg tablet Take 3 tablets (600 mg) by mouth 2 times a day as needed for mild pain (1 - 3) or moderate pain (4 - 6).      [DISCONTINUED] methocarbamol (Robaxin) 750 mg tablet Take by mouth 4 times a day.       No current facility-administered medications on file prior to visit.       Review of Systems   Constitutional: Negative.    Respiratory: Negative.  Negative for shortness of breath.    Cardiovascular: Negative.  Negative for chest pain.        Pt notes hx of aortic aneurysm in which he has a yearly visit/check    Gastrointestinal: Negative.    Genitourinary: Negative.    Musculoskeletal: Negative.    Neurological: Negative.    Hematological: Negative.       Physical Exam  Vitals and nursing note reviewed.    Constitutional:       Appearance: Normal appearance.   HENT:      Head: Normocephalic and atraumatic.   Cardiovascular:      Rate and Rhythm: Normal rate and regular rhythm.      Pulses: Normal pulses.      Heart sounds: Normal heart sounds.      Comments: EKG showed NSR @ 71   Pulmonary:      Effort: Pulmonary effort is normal.      Breath sounds: Normal breath sounds.   Abdominal:      General: Bowel sounds are normal.      Palpations: Abdomen is soft.   Musculoskeletal:         General: Normal range of motion.   Skin:     General: Skin is warm and dry.   Neurological:      Mental Status: He is alert and oriented to person, place, and time.   Psychiatric:         Mood and Affect: Mood normal.         Behavior: Behavior normal.              Airway  Vitals:    10/12/23 1524   BP: 116/81   Pulse: 74   Resp: 16   Temp: 36.3 °C (97.3 °F)   SpO2: 96%            Stop Bang Score            Assessment and Plan:     Cubital tunnel syndrome on right:  scheduled for a right ulnar nerve decompression at elbow with possible transposition on 10/16/23.  Hold NSAIDs  Hold herbal supplements   Abstain from ETOH  EKG showed: NSR @ 71  Labs on chart from 9/1/23 - cbc, cmp, A1c  ASA 2  RCRI - 0 points  Class I Risk 3.9%  SHARDA - 5 points Risk for MIKAEL - care notes provided    HTN:   BP today 116/81  Continue lisinopril as ordered, holding the night before surgery

## 2023-10-12 NOTE — PREPROCEDURE INSTRUCTIONS
Medication List            Accurate as of October 12, 2023  3:20 PM. Always use your most recent med list.                atorvastatin 40 mg tablet  Commonly known as: Lipitor  Medication Adjustments for Surgery: Continue until night before surgery     cholecalciferol 25 MCG (1000 UT) tablet  Commonly known as: Vitamin D-3  Medication Adjustments for Surgery: Stop 7 days before surgery     folic acid 400 mcg tablet  Commonly known as: Folvite  Medication Adjustments for Surgery: Stop 7 days before surgery     ibuprofen 200 mg tablet  Medication Adjustments for Surgery: Stop 7 days before surgery     lisinopril 20 mg tablet  Medication Adjustments for Surgery: Continue until night before surgery     meloxicam 15 mg tablet  Commonly known as: Mobic  Medication Adjustments for Surgery: Stop 7 days before surgery     omeprazole 40 mg DR capsule  Commonly known as: PriLOSEC  Medication Adjustments for Surgery: Continue until night before surgery     topiramate 100 mg tablet  Commonly known as: Topamax  Medication Adjustments for Surgery: Take morning of surgery with sip of water, no other fluids                              NPO Instructions:    Do not eat any food after midnight the night before your surgery/procedure.    Additional Instructions:     Seven/Six Days before Surgery:  We have sent a prescription for Hibiclens soap to your preferred pharmacy.  If you have not already, Please  your prescription and start using five days before surgery.  Follow the instruction sheet provided to you at your CPM/PAT appointment.  Five Days before Surgery:  Review your medication instructions, stop indicated medications  Three Days before Surgery:  Review your medication instructions, stop indicated medications  The Day before Surgery:  Review your medication instructions, stop indicated medications  You will be contacted regarding the time of your arrival to facility and surgery time  Do not eat any food after  Midnight  Day of Surgery:  Review your medication instructions, take indicated medications  Wear  comfortable loose fitting clothing  Do not use moisturizers, creams, lotions or perfume  All jewelry and valuables should be left at home

## 2023-10-16 ENCOUNTER — ANESTHESIA EVENT (OUTPATIENT)
Dept: OPERATING ROOM | Facility: HOSPITAL | Age: 53
End: 2023-10-16
Payer: COMMERCIAL

## 2023-10-16 ENCOUNTER — HOSPITAL ENCOUNTER (OUTPATIENT)
Facility: HOSPITAL | Age: 53
Setting detail: OUTPATIENT SURGERY
Discharge: HOME | End: 2023-10-16
Attending: ORTHOPAEDIC SURGERY | Admitting: ORTHOPAEDIC SURGERY
Payer: COMMERCIAL

## 2023-10-16 ENCOUNTER — ANESTHESIA (OUTPATIENT)
Dept: OPERATING ROOM | Facility: HOSPITAL | Age: 53
End: 2023-10-16
Payer: COMMERCIAL

## 2023-10-16 VITALS
DIASTOLIC BLOOD PRESSURE: 92 MMHG | OXYGEN SATURATION: 96 % | HEIGHT: 70 IN | BODY MASS INDEX: 44.98 KG/M2 | WEIGHT: 314.16 LBS | RESPIRATION RATE: 16 BRPM | TEMPERATURE: 97.3 F | SYSTOLIC BLOOD PRESSURE: 130 MMHG | HEART RATE: 73 BPM

## 2023-10-16 DIAGNOSIS — G89.18 ACUTE POST-OPERATIVE PAIN: Primary | ICD-10-CM

## 2023-10-16 DIAGNOSIS — G56.21 CUBITAL TUNNEL SYNDROME ON RIGHT: ICD-10-CM

## 2023-10-16 PROCEDURE — 7100000002 HC RECOVERY ROOM TIME - EACH INCREMENTAL 1 MINUTE: Performed by: ORTHOPAEDIC SURGERY

## 2023-10-16 PROCEDURE — 64718 REVISE ULNAR NERVE AT ELBOW: CPT | Performed by: ORTHOPAEDIC SURGERY

## 2023-10-16 PROCEDURE — 3700000002 HC GENERAL ANESTHESIA TIME - EACH INCREMENTAL 1 MINUTE: Performed by: ORTHOPAEDIC SURGERY

## 2023-10-16 PROCEDURE — 2500000004 HC RX 250 GENERAL PHARMACY W/ HCPCS (ALT 636 FOR OP/ED): Performed by: ORTHOPAEDIC SURGERY

## 2023-10-16 PROCEDURE — A64718 PR REVISE ULNAR NERVE AT ELBOW: Performed by: ANESTHESIOLOGY

## 2023-10-16 PROCEDURE — 7100000010 HC PHASE TWO TIME - EACH INCREMENTAL 1 MINUTE: Performed by: ORTHOPAEDIC SURGERY

## 2023-10-16 PROCEDURE — 3600000003 HC OR TIME - INITIAL BASE CHARGE - PROCEDURE LEVEL THREE: Performed by: ORTHOPAEDIC SURGERY

## 2023-10-16 PROCEDURE — 2500000005 HC RX 250 GENERAL PHARMACY W/O HCPCS

## 2023-10-16 PROCEDURE — 7100000009 HC PHASE TWO TIME - INITIAL BASE CHARGE: Performed by: ORTHOPAEDIC SURGERY

## 2023-10-16 PROCEDURE — 2500000005 HC RX 250 GENERAL PHARMACY W/O HCPCS: Performed by: ORTHOPAEDIC SURGERY

## 2023-10-16 PROCEDURE — 3700000001 HC GENERAL ANESTHESIA TIME - INITIAL BASE CHARGE: Performed by: ORTHOPAEDIC SURGERY

## 2023-10-16 PROCEDURE — 3600000008 HC OR TIME - EACH INCREMENTAL 1 MINUTE - PROCEDURE LEVEL THREE: Performed by: ORTHOPAEDIC SURGERY

## 2023-10-16 PROCEDURE — 2720000007 HC OR 272 NO HCPCS: Performed by: ORTHOPAEDIC SURGERY

## 2023-10-16 PROCEDURE — 2580000001 HC RX 258 IV SOLUTIONS: Performed by: ANESTHESIOLOGY

## 2023-10-16 PROCEDURE — 2500000004 HC RX 250 GENERAL PHARMACY W/ HCPCS (ALT 636 FOR OP/ED): Performed by: ANESTHESIOLOGY

## 2023-10-16 PROCEDURE — A4217 STERILE WATER/SALINE, 500 ML: HCPCS | Performed by: ORTHOPAEDIC SURGERY

## 2023-10-16 PROCEDURE — 7100000001 HC RECOVERY ROOM TIME - INITIAL BASE CHARGE: Performed by: ORTHOPAEDIC SURGERY

## 2023-10-16 RX ORDER — HYDROCODONE BITARTRATE AND ACETAMINOPHEN 5; 325 MG/1; MG/1
1 TABLET ORAL EVERY 6 HOURS PRN
Qty: 12 TABLET | Refills: 0 | Status: SHIPPED | OUTPATIENT
Start: 2023-10-16 | End: 2024-02-15 | Stop reason: ALTCHOICE

## 2023-10-16 RX ORDER — SODIUM CHLORIDE 0.9 G/100ML
IRRIGANT IRRIGATION AS NEEDED
Status: DISCONTINUED | OUTPATIENT
Start: 2023-10-16 | End: 2023-10-16 | Stop reason: HOSPADM

## 2023-10-16 RX ORDER — HYDRALAZINE HYDROCHLORIDE 20 MG/ML
10 INJECTION INTRAMUSCULAR; INTRAVENOUS EVERY 30 MIN PRN
Status: DISCONTINUED | OUTPATIENT
Start: 2023-10-16 | End: 2023-10-16 | Stop reason: HOSPADM

## 2023-10-16 RX ORDER — KETOROLAC TROMETHAMINE 15 MG/ML
15 INJECTION, SOLUTION INTRAMUSCULAR; INTRAVENOUS ONCE AS NEEDED
Status: DISCONTINUED | OUTPATIENT
Start: 2023-10-16 | End: 2023-10-16 | Stop reason: HOSPADM

## 2023-10-16 RX ORDER — DIPHENHYDRAMINE HYDROCHLORIDE 50 MG/ML
12.5 INJECTION INTRAMUSCULAR; INTRAVENOUS ONCE AS NEEDED
Status: DISCONTINUED | OUTPATIENT
Start: 2023-10-16 | End: 2023-10-16 | Stop reason: HOSPADM

## 2023-10-16 RX ORDER — ALBUTEROL SULFATE 0.83 MG/ML
2.5 SOLUTION RESPIRATORY (INHALATION) ONCE
Status: DISCONTINUED | OUTPATIENT
Start: 2023-10-16 | End: 2023-10-16 | Stop reason: HOSPADM

## 2023-10-16 RX ORDER — BUPIVACAINE HYDROCHLORIDE 5 MG/ML
INJECTION, SOLUTION PERINEURAL AS NEEDED
Status: DISCONTINUED | OUTPATIENT
Start: 2023-10-16 | End: 2023-10-16 | Stop reason: HOSPADM

## 2023-10-16 RX ORDER — ALBUTEROL SULFATE 0.83 MG/ML
2.5 SOLUTION RESPIRATORY (INHALATION) ONCE AS NEEDED
Status: DISCONTINUED | OUTPATIENT
Start: 2023-10-16 | End: 2023-10-16 | Stop reason: HOSPADM

## 2023-10-16 RX ORDER — METOCLOPRAMIDE 10 MG/1
10 TABLET ORAL ONCE
Status: DISCONTINUED | OUTPATIENT
Start: 2023-10-16 | End: 2023-10-16 | Stop reason: HOSPADM

## 2023-10-16 RX ORDER — ACETAMINOPHEN 10 MG/ML
1000 INJECTION, SOLUTION INTRAVENOUS ONCE AS NEEDED
Status: DISCONTINUED | OUTPATIENT
Start: 2023-10-16 | End: 2023-10-16 | Stop reason: HOSPADM

## 2023-10-16 RX ORDER — OXYCODONE HCL 10 MG/1
10 TABLET, FILM COATED, EXTENDED RELEASE ORAL ONCE AS NEEDED
Status: DISCONTINUED | OUTPATIENT
Start: 2023-10-16 | End: 2023-10-16 | Stop reason: HOSPADM

## 2023-10-16 RX ORDER — ONDANSETRON HYDROCHLORIDE 2 MG/ML
4 INJECTION, SOLUTION INTRAVENOUS ONCE AS NEEDED
Status: DISCONTINUED | OUTPATIENT
Start: 2023-10-16 | End: 2023-10-16 | Stop reason: HOSPADM

## 2023-10-16 RX ORDER — SODIUM CHLORIDE, SODIUM LACTATE, POTASSIUM CHLORIDE, CALCIUM CHLORIDE 600; 310; 30; 20 MG/100ML; MG/100ML; MG/100ML; MG/100ML
50 INJECTION, SOLUTION INTRAVENOUS CONTINUOUS
Status: DISCONTINUED | OUTPATIENT
Start: 2023-10-16 | End: 2023-10-16 | Stop reason: HOSPADM

## 2023-10-16 RX ORDER — MIDAZOLAM HYDROCHLORIDE 1 MG/ML
INJECTION, SOLUTION INTRAMUSCULAR; INTRAVENOUS AS NEEDED
Status: DISCONTINUED | OUTPATIENT
Start: 2023-10-16 | End: 2023-10-16

## 2023-10-16 RX ORDER — LIDOCAINE HYDROCHLORIDE 10 MG/ML
INJECTION INFILTRATION; PERINEURAL AS NEEDED
Status: DISCONTINUED | OUTPATIENT
Start: 2023-10-16 | End: 2023-10-16 | Stop reason: HOSPADM

## 2023-10-16 RX ORDER — FAMOTIDINE 20 MG/1
20 TABLET, FILM COATED ORAL ONCE
Status: DISCONTINUED | OUTPATIENT
Start: 2023-10-16 | End: 2023-10-16 | Stop reason: HOSPADM

## 2023-10-16 RX ORDER — FENTANYL CITRATE 50 UG/ML
INJECTION, SOLUTION INTRAMUSCULAR; INTRAVENOUS AS NEEDED
Status: DISCONTINUED | OUTPATIENT
Start: 2023-10-16 | End: 2023-10-16

## 2023-10-16 RX ORDER — LABETALOL HYDROCHLORIDE 5 MG/ML
10 INJECTION, SOLUTION INTRAVENOUS ONCE AS NEEDED
Status: DISCONTINUED | OUTPATIENT
Start: 2023-10-16 | End: 2023-10-16 | Stop reason: HOSPADM

## 2023-10-16 RX ADMIN — MIDAZOLAM 1 MG: 1 INJECTION INTRAMUSCULAR; INTRAVENOUS at 10:40

## 2023-10-16 RX ADMIN — Medication 3 G: at 10:12

## 2023-10-16 RX ADMIN — MIDAZOLAM 1 MG: 1 INJECTION INTRAMUSCULAR; INTRAVENOUS at 10:31

## 2023-10-16 RX ADMIN — FENTANYL CITRATE 50 MCG: 50 INJECTION INTRAMUSCULAR; INTRAVENOUS at 10:16

## 2023-10-16 RX ADMIN — FENTANYL CITRATE 50 MCG: 50 INJECTION INTRAMUSCULAR; INTRAVENOUS at 10:30

## 2023-10-16 RX ADMIN — MIDAZOLAM 2 MG: 1 INJECTION INTRAMUSCULAR; INTRAVENOUS at 10:05

## 2023-10-16 RX ADMIN — MIDAZOLAM 2 MG: 1 INJECTION INTRAMUSCULAR; INTRAVENOUS at 10:15

## 2023-10-16 RX ADMIN — FENTANYL CITRATE 50 MCG: 50 INJECTION INTRAMUSCULAR; INTRAVENOUS at 10:21

## 2023-10-16 RX ADMIN — FENTANYL CITRATE 50 MCG: 50 INJECTION INTRAMUSCULAR; INTRAVENOUS at 10:40

## 2023-10-16 RX ADMIN — SODIUM CHLORIDE, SODIUM LACTATE, POTASSIUM CHLORIDE, AND CALCIUM CHLORIDE 50 ML/HR: 600; 310; 30; 20 INJECTION, SOLUTION INTRAVENOUS at 08:26

## 2023-10-16 RX ADMIN — SODIUM CHLORIDE 3 G: 900 INJECTION, SOLUTION INTRAVENOUS at 08:27

## 2023-10-16 SDOH — HEALTH STABILITY: MENTAL HEALTH: CURRENT SMOKER: 0

## 2023-10-16 ASSESSMENT — COLUMBIA-SUICIDE SEVERITY RATING SCALE - C-SSRS
2. HAVE YOU ACTUALLY HAD ANY THOUGHTS OF KILLING YOURSELF?: NO
6. HAVE YOU EVER DONE ANYTHING, STARTED TO DO ANYTHING, OR PREPARED TO DO ANYTHING TO END YOUR LIFE?: NO
1. IN THE PAST MONTH, HAVE YOU WISHED YOU WERE DEAD OR WISHED YOU COULD GO TO SLEEP AND NOT WAKE UP?: NO

## 2023-10-16 ASSESSMENT — PAIN - FUNCTIONAL ASSESSMENT
PAIN_FUNCTIONAL_ASSESSMENT: 0-10

## 2023-10-16 ASSESSMENT — PAIN SCALES - GENERAL
PAINLEVEL_OUTOF10: 0 - NO PAIN
PAIN_LEVEL: 2
PAINLEVEL_OUTOF10: 0 - NO PAIN
PAINLEVEL_OUTOF10: 0 - NO PAIN
PAINLEVEL_OUTOF10: 8
PAINLEVEL_OUTOF10: 0 - NO PAIN
PAINLEVEL_OUTOF10: 0 - NO PAIN

## 2023-10-16 ASSESSMENT — PAIN DESCRIPTION - DESCRIPTORS: DESCRIPTORS: ACHING;NUMBNESS

## 2023-10-16 NOTE — OP NOTE
ORTHOPEDIC OPERATIVE NOTE    Name:     Surendra Maher  :     1970  Facility:    [facility]  Date of Surgery:   10/16/23     PREOP DX:          Right Cubital Tunnel Syndrome    POSTOP DX:       Same    PROCEDURE:     Right In-Situ Ulnar Nerve Decompression at the Elbow    SURGEON: JR Merlin MD    RESIDENT/FELLOW/ASSISTANT:  Edmond Soto MD    ANESTHESIA:    MAC Sedation + Local    ESTIMATED BLOOD LOSS :   2 ml    TOTAL FLUIDS:     750 cc LR    SPECIMEN:   None    TOURNIQUET TIME: 18 minutes at 250 mmHg    FINDINGS:   Ulnar nerve compression at Zazueta's fascia    COMPLICATIONS:  None    PATIENT RETURNED TO/CONDITION:  PACU in Good      INDICATIONS:      Surendra Maher is a 53 y.o.,  right-hand dominant male who presents with numbness and tingling in the ulnar nerve distribution of the right hand that has failed to respond to conservative measures.  He is here for elective right cubital tunnel release.  I reminded him of surgical risks of infection; scarring; damage to nerves, tendons, or vessels; stiffness; wound healing problems; failure to relieve symptoms; recurrent symptoms; and nerve instability.  He wished to proceed.     NARRATIVE:    Following identification of the patient and confirmation of correct site of surgery and signed operative consent, he was brought to the operating room and a hand table affixed to the cart.  A light MAC sedation was administered by Anesthesia along with IV antibiotic dose.  A pneumatic tourniquet was placed high on the right arm, and the limb was prepped from fingertip to cuff with chlorhexidine, and draped free in the usual sterile fashion.  15 mL of a mix of 0.5% Marcaine and 1% lidocaine, plain, was instilled to the planned incision area. The limb was exsanguinated with an Esmarch, and the tourniquet inflated.    A 4 cm curvilinear incision was made just posterior to the medial epicondyle and taken carefully bluntly down under high loupe  magnification. Two crossing branches of the MABC nerve were identified and carefully protected throughout the case.  The ulnar nerve was identified in the usual location, just anterior to the triceps muscle belly above the elbow.  It was carefully unroofed over its visualized length from the arcade of [place] to 6 cm below the medial epicondyle.  There was an obvious section of compressed nerve at Zazueta's fascia.  Upon decompression, there was good refill of the longitudinal vessel.  With the nerve completely decompressed and under direct vision, the elbow was taken through a full passive range of motion.  There was no tendency toward anterior subluxation of the nerve, and thus no need for a formal anterior transposition. The tourniquet was deflated, and pink color rapidly returned to all digits.  Meticulous hemostasis was achieved with bipolar.  After final irrigation, skin was closed with 4-0 vicryl subcutaneous and 4-0 Monocryl running subcuticular stitch.  Steri-Strips, Xeroform, and a soft dressing were applied.  The patient was awakened and transferred to Recovery in stable condition.          Electronically signed  ADAM Boyer MD  [phone]

## 2023-10-16 NOTE — H&P
H&P reviewed. The patient was examined and there are no changes to the H&P. Patient electing to proceed with surgery. Patient marked and consented.     Edmond Soto MD  PGY-3 Orthopedic Surgery  [facility]  Phone: [phone]  Pager: [phone]  Available by Epic Message

## 2023-10-16 NOTE — DISCHARGE INSTRUCTIONS
Follow-Up Instructions    You will need to be seen in clinic in 10-15 days for a post-operative evaluation.  This appointment will be in the outpatient office, not at the Surgery Center.    You will need to call Penny in my office and schedule an appointment, unless there is a previous appointment that appears on your discharge instructions.  Her phone number is [phone].  Please do not delay in calling to make this appointment.      Activity Restrictions    1)  No driving for 24 hours after surgery, due to the anesthetic.    2)  No driving or operating heavy machinery while taking narcotic pain medication.    3)  Weight bearing as tolerated.  Light use of the fingers (writing, typing, texting) is good to do.     Discharge Medications    A prescription for a narcotic pain medication (Norco) has been sent to your pharmacy of record.  I do not expect you will need this, but wanted you to have it available as an option if over-the-counter medications are not adequately controlling your pain.  Most people simply take Tylenol, Motrin, Advil, or other anti-inflammatory for the pain.  If you do end up taking the prescription medication, please try to wean yourself off this as quickly as possible.    You can add the prescription medication to the anti-inflammatories if needed, but should not add it to Tylenol, as there is already Tylenol in the prescription.    Wound care instructions:     1)  Leave operative dressing in place for 7 days.  If you shower, cover the hand with a plastic bag and elevate it so the water cannot run down into the bag.    2)  After 7 days, remove the bandage and leave the incision open to air, or cover with a simple Band-Aid.   At that point, you may let water run freely over incision when showering.  Do not scrub.  Do not soak in pool or tub, or submerge the incision until you are fully 21 days from surgery.    3)  Call if any drainage after 7 days, increased redness/warmth/swelling at  incision site, abnormal pain/tenderness of the extremity, abnormal swelling of the extremity that does not respond to elevation, shortness of breath, or chest pain.

## 2023-10-16 NOTE — ANESTHESIA POSTPROCEDURE EVALUATION
Patient: Surendra Maher    Procedure Summary       Date: 10/16/23 Room / Location: [facility] / [facility]    Anesthesia Start: 1008 Anesthesia Stop: 1056    Procedure: Right Ulnar Nerve Decompression at Elbow with Possible Transposition (Right: Elbow) Diagnosis:       Cubital tunnel syndrome on right      (Cubital tunnel syndrome on right [G56.21])    Surgeons: Emil Boyer MD Responsible Provider: Arik Brizuela DO    Anesthesia Type: general ASA Status: 3            Anesthesia Type: general    Vitals Value Taken Time   /95 10/16/23 1101   Temp 36.3 10/16/23 1101   Pulse 75 10/16/23 1101   Resp 16 10/16/23 1101   SpO2 96 10/16/23 1101       Anesthesia Post Evaluation    Patient location during evaluation: bedside  Level of consciousness: awake  Pain score: 2  Pain management: adequate  Airway patency: patent  Two or more strategies used to mitigate risk of obstructive sleep apnea  Cardiovascular status: acceptable  Respiratory status: acceptable  Hydration status: acceptable        No notable events documented.

## 2023-10-16 NOTE — BRIEF OP NOTE
Date: 10/16/2023  OR Location: [facility]    Name: Surendra Maher, : 1970, Age: 53 y.o., MRN: [id number], Sex: male    Diagnosis  Pre-op Diagnosis     * Cubital tunnel syndrome on right [G56.21] Post-op Diagnosis     * Cubital tunnel syndrome on right [G56.21]     Procedures  Right Ulnar Nerve Decompression at Elbow with Possible Transposition  [address] - [place] NEUROPLASTY &/TRANSPOSITION ULNAR NERVE ELBOW      Surgeons      * Emil Boyer - Primary    Resident/Fellow/Other Assistant:  Edmond Soto     Procedure Summary  Anesthesia: Monitor Anesthesia Care  ASA: III  Anesthesia Staff: Anesthesiologist: Arik Brizuela DO  Estimated Blood Loss: less than 5mL  Intra-op Medications:   Medication Name Total Dose   lactated Ringer's infusion Cannot be calculated              Anesthesia Record               Intraprocedure I/O Totals       None           Specimen: No specimens collected     Staff:   Circulator: Yary Varghese RN  Scrub Person: Payton Rvias          Findings: Ulnar nerve compression    Complications:  None; patient tolerated the procedure well.     Disposition: PACU - hemodynamically stable.  Condition: stable  Specimens Collected: No specimens collected  Attending Attestation: I was present and scrubbed for the entire procedure.    Emil Boyer  Phone Number: [phone]

## 2023-10-16 NOTE — ANESTHESIA PREPROCEDURE EVALUATION
Patient: Surendra Maher    Procedure Information       Date/Time: 10/16/23 0915    Procedure: Right Ulnar Nerve Decompression at Elbow with Possible Transposition (Right: Elbow)    Location: [facility] / [facility]    Surgeons: Emil Boyer MD            Relevant Problems   Cardiovascular   (+) Benign essential HTN   (+) Thoracic aortic aneurysm without rupture (CMS/HCC)      Endocrine   (+) Obesity      GI   (+) Gastroesophageal reflux disease without esophagitis      Neuro/Psych   (+) Cervical radiculitis   (+) Cubital tunnel syndrome on right   (+) Lumbar radiculopathy      GI/Hepatic   (+) Cholelithiasis      Musculoskeletal   (+) Chronic low back pain   (+) Degenerative joint disease (DJD) of hip   (+) Primary localized osteoarthrosis of left hip   (+) Primary osteoarthritis of right knee   (+) Spinal stenosis       Clinical information reviewed:   Tobacco  Allergies  Meds   Med Hx  Surg Hx   Fam Hx  Soc Hx        NPO Detail:  NPO/Void Status  Date of Last Liquid: 10/15/23  Time of Last Liquid: 2230  Date of Last Solid: 10/15/23  Time of Last Solid: 1930  Last Intake Type: Solid meal; Clear fluids  Time of Last Void: 0700         Physical Exam    Airway  Mallampati: III     Cardiovascular   Rhythm: regular  Rate: normal     Dental - normal exam     Pulmonary   Breath sounds clear to auscultation     Abdominal   Abdomen: soft             Anesthesia Plan    ASA 3     general     The patient is not a current smoker.  Medical reason for not educating patient about risks of obstructive sleep apnea.  intravenous induction   Postoperative administration of opioids is intended.  Anesthetic plan and risks discussed with patient.    Plan discussed with CRNA, attending and CAA.

## 2023-10-16 NOTE — PERIOPERATIVE NURSING NOTE
PT  ARRIVED to pacu. Awake and  alert answering questions. No c/o  pain at this time. Report rec'd by anesthesia and OR nurse.

## 2023-10-17 ASSESSMENT — PAIN SCALES - GENERAL: PAINLEVEL_OUTOF10: 0 - NO PAIN

## 2023-10-26 DIAGNOSIS — M54.50 CHRONIC LOW BACK PAIN, UNSPECIFIED BACK PAIN LATERALITY, UNSPECIFIED WHETHER SCIATICA PRESENT: Primary | ICD-10-CM

## 2023-10-26 DIAGNOSIS — I10 BENIGN ESSENTIAL HTN: ICD-10-CM

## 2023-10-26 DIAGNOSIS — G89.29 CHRONIC LOW BACK PAIN, UNSPECIFIED BACK PAIN LATERALITY, UNSPECIFIED WHETHER SCIATICA PRESENT: Primary | ICD-10-CM

## 2023-10-26 DIAGNOSIS — K21.9 GASTROESOPHAGEAL REFLUX DISEASE WITHOUT ESOPHAGITIS: ICD-10-CM

## 2023-10-26 DIAGNOSIS — E78.00 ELEVATED LDL CHOLESTEROL LEVEL: ICD-10-CM

## 2023-10-26 RX ORDER — OMEPRAZOLE 40 MG/1
40 CAPSULE, DELAYED RELEASE ORAL DAILY
Qty: 90 CAPSULE | Refills: 0 | Status: SHIPPED | OUTPATIENT
Start: 2023-10-26 | End: 2024-02-15 | Stop reason: SDUPTHER

## 2023-10-26 RX ORDER — TOPIRAMATE 100 MG/1
100 TABLET, FILM COATED ORAL 2 TIMES DAILY
Qty: 180 TABLET | Refills: 0 | Status: SHIPPED | OUTPATIENT
Start: 2023-10-26 | End: 2024-04-09 | Stop reason: SDUPTHER

## 2023-10-26 RX ORDER — LISINOPRIL 20 MG/1
20 TABLET ORAL DAILY
Qty: 90 TABLET | Refills: 0 | Status: SHIPPED | OUTPATIENT
Start: 2023-10-26 | End: 2024-01-26 | Stop reason: SDUPTHER

## 2023-10-26 RX ORDER — ATORVASTATIN CALCIUM 40 MG/1
40 TABLET, FILM COATED ORAL NIGHTLY
Qty: 90 TABLET | Refills: 0 | Status: SHIPPED | OUTPATIENT
Start: 2023-10-26 | End: 2024-01-26 | Stop reason: SDUPTHER

## 2023-10-27 ENCOUNTER — OFFICE VISIT (OUTPATIENT)
Dept: ORTHOPEDIC SURGERY | Facility: CLINIC | Age: 53
End: 2023-10-27
Payer: COMMERCIAL

## 2023-10-27 VITALS — WEIGHT: 314 LBS | HEIGHT: 70 IN | BODY MASS INDEX: 44.95 KG/M2

## 2023-10-27 DIAGNOSIS — G56.21 CUBITAL TUNNEL SYNDROME ON RIGHT: Primary | ICD-10-CM

## 2023-10-27 PROCEDURE — 3008F BODY MASS INDEX DOCD: CPT | Performed by: ORTHOPAEDIC SURGERY

## 2023-10-27 PROCEDURE — 3079F DIAST BP 80-89 MM HG: CPT | Performed by: ORTHOPAEDIC SURGERY

## 2023-10-27 PROCEDURE — 99024 POSTOP FOLLOW-UP VISIT: CPT | Performed by: ORTHOPAEDIC SURGERY

## 2023-10-27 PROCEDURE — 3074F SYST BP LT 130 MM HG: CPT | Performed by: ORTHOPAEDIC SURGERY

## 2023-10-27 PROCEDURE — 1036F TOBACCO NON-USER: CPT | Performed by: ORTHOPAEDIC SURGERY

## 2023-10-27 ASSESSMENT — PAIN - FUNCTIONAL ASSESSMENT
PAIN_FUNCTIONAL_ASSESSMENT: 0-10
PAIN_FUNCTIONAL_ASSESSMENT: NO/DENIES PAIN

## 2023-10-27 ASSESSMENT — PAIN SCALES - GENERAL: PAINLEVEL_OUTOF10: 3

## 2023-10-27 ASSESSMENT — PAIN DESCRIPTION - DESCRIPTORS: DESCRIPTORS: NUMBNESS;TINGLING;DISCOMFORT

## 2023-10-27 NOTE — LETTER
November 6, 2023     Santana Barton MD  [address] [place] [address] [address]  [address]  [facility] [address]    Patient: Surendra Maher   YOB: 1970   Date of Visit: 10/27/2023       Dear Dr. Santana Barton MD:    Thank you for referring Surendra Maher to me for evaluation. Below are my notes for this consultation.  If you have questions, please do not hesitate to call me. I look forward to following your patient along with you.       Sincerely,     Emil Boyer MD      CC: No Recipients  ______________________________________________________________________________________    CHIEF COMPLAINT         Postop elbow    ASSESSMENT + PLAN    Postop day 11 from right in situ ulnar nerve decompression    I reviewed the expected time course of nerve symptom resolution from here.  The incision is healing normally.  Sutures are absorbable.  You may get this wet, but should not soak it for one more week.  Advance activity as pain allows.  Work on the stretching exercises that I demonstrated. Contact my office if you would like a formal occupational therapy referral.     Follow up with any concerns.        HISTORY OF PRESENT ILLNESS       Patient returns today, postop day 11 from right ulnar nerve decompression at the elbow.  He reports appropriately resolving tenderness at the elbow incision.  The tingling in the fingers is already much better, though not yet fully gone.  No new concerns.  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      PHYSICAL EXAM       Patient had removed dressing as instructed.  Incision clean, dry, intact with absorbable suture in place.  Full composite finger flexion extension with intact intrinsic function.   strength is still asymmetric weaker as expected.  Elbow lacks about 20 degrees of full extension.  Symmetric flexion.  Full rotation.  Sensation intact to light touch distally though still a little subjectively altered in the ulnar small.  Capillary  refill less than 2 seconds throughout.  2+ radial and ulnar pulses.      IMAGING / LABS / EMGs        None today      Electronically Signed      ADAM Boyer MD      Orthopaedic Hand Surgery      [phone]

## 2023-11-02 ENCOUNTER — HOSPITAL ENCOUNTER (OUTPATIENT)
Dept: RADIOLOGY | Facility: HOSPITAL | Age: 53
Discharge: HOME | End: 2023-11-02
Payer: COMMERCIAL

## 2023-11-02 DIAGNOSIS — I71.21 ANEURYSM OF THE ASCENDING AORTA, WITHOUT RUPTURE (CMS-HCC): ICD-10-CM

## 2023-11-02 PROCEDURE — 2550000001 HC RX 255 CONTRASTS: Performed by: THORACIC SURGERY (CARDIOTHORACIC VASCULAR SURGERY)

## 2023-11-02 PROCEDURE — 71270 CT THORAX DX C-/C+: CPT | Performed by: RADIOLOGY

## 2023-11-02 PROCEDURE — 71270 CT THORAX DX C-/C+: CPT

## 2023-11-02 RX ADMIN — IOHEXOL 90 ML: 350 INJECTION, SOLUTION INTRAVENOUS at 08:49

## 2023-11-06 ENCOUNTER — OFFICE VISIT (OUTPATIENT)
Dept: CARDIAC SURGERY | Facility: CLINIC | Age: 53
End: 2023-11-06
Payer: COMMERCIAL

## 2023-11-06 VITALS
SYSTOLIC BLOOD PRESSURE: 106 MMHG | OXYGEN SATURATION: 96 % | WEIGHT: 315 LBS | DIASTOLIC BLOOD PRESSURE: 74 MMHG | BODY MASS INDEX: 45.1 KG/M2 | RESPIRATION RATE: 18 BRPM | HEIGHT: 70 IN | TEMPERATURE: 98 F | HEART RATE: 88 BPM

## 2023-11-06 DIAGNOSIS — I71.21 ANEURYSM OF ASCENDING AORTA WITHOUT RUPTURE (CMS-HCC): Primary | ICD-10-CM

## 2023-11-06 PROCEDURE — 3078F DIAST BP <80 MM HG: CPT | Performed by: THORACIC SURGERY (CARDIOTHORACIC VASCULAR SURGERY)

## 2023-11-06 PROCEDURE — 3008F BODY MASS INDEX DOCD: CPT | Performed by: THORACIC SURGERY (CARDIOTHORACIC VASCULAR SURGERY)

## 2023-11-06 PROCEDURE — 99214 OFFICE O/P EST MOD 30 MIN: CPT | Performed by: THORACIC SURGERY (CARDIOTHORACIC VASCULAR SURGERY)

## 2023-11-06 PROCEDURE — 3074F SYST BP LT 130 MM HG: CPT | Performed by: THORACIC SURGERY (CARDIOTHORACIC VASCULAR SURGERY)

## 2023-11-06 PROCEDURE — 1036F TOBACCO NON-USER: CPT | Performed by: THORACIC SURGERY (CARDIOTHORACIC VASCULAR SURGERY)

## 2023-11-06 SDOH — ECONOMIC STABILITY: FOOD INSECURITY: WITHIN THE PAST 12 MONTHS, THE FOOD YOU BOUGHT JUST DIDN'T LAST AND YOU DIDN'T HAVE MONEY TO GET MORE.: NEVER TRUE

## 2023-11-06 SDOH — ECONOMIC STABILITY: FOOD INSECURITY: WITHIN THE PAST 12 MONTHS, YOU WORRIED THAT YOUR FOOD WOULD RUN OUT BEFORE YOU GOT MONEY TO BUY MORE.: NEVER TRUE

## 2023-11-06 ASSESSMENT — ENCOUNTER SYMPTOMS
LOSS OF SENSATION IN FEET: 1
DEPRESSION: 0
OCCASIONAL FEELINGS OF UNSTEADINESS: 1

## 2023-11-06 ASSESSMENT — PATIENT HEALTH QUESTIONNAIRE - PHQ9
SUM OF ALL RESPONSES TO PHQ9 QUESTIONS 1 AND 2: 0
1. LITTLE INTEREST OR PLEASURE IN DOING THINGS: NOT AT ALL
2. FEELING DOWN, DEPRESSED OR HOPELESS: NOT AT ALL

## 2023-11-06 ASSESSMENT — PAIN SCALES - GENERAL: PAINLEVEL: 0-NO PAIN

## 2023-11-06 NOTE — LETTER
November 6, 2023     Santana Barton MD  [address] [place] [address] [address]  [address]  [facility] [address]    Patient: Surendra Maher   YOB: 1970   Date of Visit: 11/6/2023       Dear Dr. Santana Barton MD:    Thank you for referring Surendra Maher to me for evaluation. Below are my notes for this consultation.  If you have questions, please do not hesitate to call me. I look forward to following your patient along with you.       Sincerely,     Tien Castañeda MD      CC: Historical Provider, MD  ______________________________________________________________________________________    Chief Complaint  Surveillance visit    HPI:   Mr. Surendra Maher is an 53 y.o. male, who is a patient of Dr.Fenny Mick MD   I have been asked to see them by Dr. Barton, to evaluate for ascending aortic aneurysm.  He recently underwent ulnar relocation surgery.  He is recovering ok from this.  Otherwise, he has no symptoms.  The aortic aneurysm was incidentally found on imaging for surgery.       Past Medical History:   Diagnosis Date   • Abdominal distension (gaseous) 02/02/2021    Bloating   • Aftercare following joint replacement surgery 02/02/2021    Aftercare following right knee joint replacement surgery   • Aortic aneurysm (CMS/HCC)    • Dorsopathy, unspecified     Back problem   • Encounter for other preprocedural examination 05/15/2020    Preoperative clearance   • Encounter for screening for lipoid disorders 02/02/2021    Screening for lipid disorders   • Encounter for screening for malignant neoplasm of colon 02/02/2021    Screening for colon cancer   • Other cervical disc degeneration, unspecified cervical region     Degenerative disc disease, cervical   • Pain in left hip     Hip pain, left   • Pain in right hip     Hip pain, right   • Personal history of other diseases of the circulatory system     History of hypertension   • Personal history of other diseases of the digestive system     History of esophageal reflux   • Personal history  of other diseases of the musculoskeletal system and connective tissue     History of arthritis   • Personal history of other diseases of the nervous system and sense organs     History of otitis media   • Personal history of other diseases of the respiratory system     History of sinusitis   • Personal history of other diseases of the respiratory system     History of allergic rhinitis   • Personal history of other specified conditions 02/02/2021    History of dysuria   • Radiculopathy, cervical region     Cervical radiculitis   • Unspecified abnormal findings in urine 02/02/2021    Bad odor of urine       Past Surgical History:   Procedure Laterality Date   • APPENDECTOMY  05/19/2015    Appendectomy   • BACK SURGERY  05/19/2015    Lower Back Surgery   • CHOLECYSTECTOMY     • GALLBLADDER SURGERY  05/19/2015    Gallbladder Surgery   • OTHER SURGICAL HISTORY  05/19/2015    Wrist Surgery   • OTHER SURGICAL HISTORY  11/09/2022    Hip replacement   • OTHER SURGICAL HISTORY  11/09/2022    Knee replacement       Family History   Problem Relation Name Age of Onset   • Other (cardiac disorder) Mother     • Diabetes Mother     • Arthritis Father     • Other (cardiac disorder) Father     • Coronary artery disease Father     • Diabetes Father     • Other (cardiac disorder) Maternal Grandmother     • Diabetes Maternal Grandmother     • Other (cardiac disorder) Maternal Grandfather     • Diabetes Maternal Grandfather     • Arthritis Paternal Grandmother     • Other (cadiac disorder) Paternal Grandmother     • Diabetes Paternal Grandmother     • Arthritis Paternal Grandfather     • Other (cardiac disorder) Paternal Grandfather     • Diabetes Paternal Grandfather         Social History     Socioeconomic History   • Marital status:      Spouse name: Not on file   • Number of children: Not on file   • Years of education: Not on file   • Highest education level: Not on file   Occupational History   • Not on file   Tobacco Use    • Smoking status: Former     Packs/day: 1.50     Years: 20.00     Additional pack years: 0.00     Total pack years: 30.00     Types: Cigarettes     Quit date:      Years since quittin.8   • Smokeless tobacco: Never   Vaping Use   • Vaping Use: Never used   Substance and Sexual Activity   • Alcohol use: Not Currently     Alcohol/week: 30.0 standard drinks of alcohol     Types: 30 Cans of beer per week     Comment: 30 PER WEEKEND   • Drug use: Never   • Sexual activity: Not on file   Other Topics Concern   • Not on file   Social History Narrative   • Not on file     Social Determinants of Health     Financial Resource Strain: Not on file   Food Insecurity: No Food Insecurity (2023)    Hunger Vital Sign    • Worried About Running Out of Food in the Last Year: Never true    • Ran Out of Food in the Last Year: Never true   Transportation Needs: Not on file   Physical Activity: Not on file   Stress: Not on file   Social Connections: Not on file   Intimate Partner Violence: Not on file   Housing Stability: Not on file       Allergies   Allergen Reactions   • Neosporin (Neomycin-Polymyx) Hives       Outpatient Encounter Medications as of 2023   Medication Sig Dispense Refill   • atorvastatin (Lipitor) 40 mg tablet Take 1 tablet (40 mg) by mouth once daily at bedtime. 90 tablet 0   • cholecalciferol (Vitamin D-3) 25 MCG (1000 UT) tablet Take 1 tablet (25 mcg) by mouth once daily.     • folic acid (Folvite) 400 mcg tablet Take 1 tablet (0.4 mg) by mouth once daily.     • HYDROcodone-acetaminophen (Norco) 5-325 mg tablet Take 1 tablet by mouth every 6 hours if needed for severe pain (7 - 10). 12 tablet 0   • ibuprofen 200 mg tablet Take 3 tablets (600 mg) by mouth 2 times a day as needed for mild pain (1 - 3) or moderate pain (4 - 6).     • lisinopril 20 mg tablet Take 1 tablet (20 mg) by mouth once daily. 90 tablet 0   • meloxicam (Mobic) 15 mg tablet      • omeprazole (PriLOSEC) 40 mg DR capsule Take 1  capsule (40 mg) by mouth once daily. 90 capsule 0   • topiramate (Topamax) 100 mg tablet Take 1 tablet (100 mg) by mouth 2 times a day. 180 tablet 0     No facility-administered encounter medications on file as of 11/6/2023.       Physical Exam  Constitutional:       Appearance: Normal appearance. He is obese.   HENT:      Head: Normocephalic and atraumatic.      Mouth/Throat:      Mouth: Mucous membranes are moist.   Eyes:      General: No scleral icterus.     Pupils: Pupils are equal, round, and reactive to light.   Cardiovascular:      Rate and Rhythm: Normal rate and regular rhythm.      Pulses: Normal pulses.      Heart sounds: Normal heart sounds. No murmur heard.  Pulmonary:      Effort: Pulmonary effort is normal.      Breath sounds: Normal breath sounds.   Musculoskeletal:         General: Normal range of motion.      Cervical back: Normal range of motion.   Skin:     General: Skin is warm and dry.   Neurological:      General: No focal deficit present.      Mental Status: He is alert and oriented to person, place, and time.   Psychiatric:         Mood and Affect: Mood normal.         Thought Content: Thought content normal.             Lab Results   Component Value Date    WBC 6.0 09/01/2023    HGB 13.2 (L) 09/01/2023    HCT 42.6 09/01/2023    MCV 99 09/01/2023     09/01/2023     Lab Results   Component Value Date    GLUCOSE 95 09/01/2023    CALCIUM 9.4 09/01/2023     09/01/2023    K 4.1 09/01/2023    CO2 24 09/01/2023     09/01/2023    BUN 13 09/01/2023    CREATININE 0.86 09/01/2023     CT Chest: dated November 2, 2023: The aorta is not calcified.  His ascending segment measures 4.3 cm at the level of the mid right pulmonary artery.  There has been on change since his last CTA.       Assessment and Plan:   Mr. Surendra Maher is an 53 y.o. male who presents with ascending aortic aneurysm.  This is associated with HTN, HLD, Low Vit D, Obesity and lumbar radiculopathy.  He remains symptom  free.  Their imaging is consistent with a stable ascending aortic aneurysm with no growth.  I discussed with him the natural history of aortic aneurysm disease, and the need for follow-up and surveillance. We discussed that hypertension is the leading cause of aneurysmal growth, and he is maintaining his blood pressure in a normal range. I discussed with him that our goal should be a systolic blood pressure of 145 mmHg or less, and a diastolic blood pressure of 80 mmHg or less. We also discussed cardiovascular activities that are permissible and in fact encouraged for weight loss, and the avoidance of heavy lifting and increases in intrathoracic pressure. We also discussed that current guidelines state a diameter of 55 mm is when we should discuss surgical intervention. Obviously if he has growth of his aneurysm this may need to be performed for later.     Our plan will be for CTA in one year from now for surveillance.

## 2023-11-06 NOTE — PROGRESS NOTES
CHIEF COMPLAINT         Postop elbow    ASSESSMENT + PLAN    Postop day 11 from right in situ ulnar nerve decompression    I reviewed the expected time course of nerve symptom resolution from here.  The incision is healing normally.  Sutures are absorbable.  You may get this wet, but should not soak it for one more week.  Advance activity as pain allows.  Work on the stretching exercises that I demonstrated. Contact my office if you would like a formal occupational therapy referral.     Follow up with any concerns.        HISTORY OF PRESENT ILLNESS       Patient returns today, postop day 11 from right ulnar nerve decompression at the elbow.  He reports appropriately resolving tenderness at the elbow incision.  The tingling in the fingers is already much better, though not yet fully gone.  No new concerns.  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      PHYSICAL EXAM       Patient had removed dressing as instructed.  Incision clean, dry, intact with absorbable suture in place.  Full composite finger flexion extension with intact intrinsic function.   strength is still asymmetric weaker as expected.  Elbow lacks about 20 degrees of full extension.  Symmetric flexion.  Full rotation.  Sensation intact to light touch distally though still a little subjectively altered in the ulnar small.  Capillary refill less than 2 seconds throughout.  2+ radial and ulnar pulses.      IMAGING / LABS / EMGs        None today      Electronically Signed      ADAM Boyer MD      Orthopaedic Hand Surgery      [phone]

## 2023-11-06 NOTE — PROGRESS NOTES
Chief Complaint  Surveillance visit    HPI:   Mr. Surendra Maher is an 53 y.o. male, who is a patient of Dr.Fenny Mick MD   I have been asked to see them by Dr. Barton, to evaluate for ascending aortic aneurysm.  He recently underwent ulnar relocation surgery.  He is recovering ok from this.  Otherwise, he has no symptoms.  The aortic aneurysm was incidentally found on imaging for surgery.       Past Medical History:   Diagnosis Date    Abdominal distension (gaseous) 02/02/2021    Bloating    Aftercare following joint replacement surgery 02/02/2021    Aftercare following right knee joint replacement surgery    Aortic aneurysm (CMS/HCC)     Dorsopathy, unspecified     Back problem    Encounter for other preprocedural examination 05/15/2020    Preoperative clearance    Encounter for screening for lipoid disorders 02/02/2021    Screening for lipid disorders    Encounter for screening for malignant neoplasm of colon 02/02/2021    Screening for colon cancer    Other cervical disc degeneration, unspecified cervical region     Degenerative disc disease, cervical    Pain in left hip     Hip pain, left    Pain in right hip     Hip pain, right    Personal history of other diseases of the circulatory system     History of hypertension    Personal history of other diseases of the digestive system     History of esophageal reflux    Personal history of other diseases of the musculoskeletal system and connective tissue     History of arthritis    Personal history of other diseases of the nervous system and sense organs     History of otitis media    Personal history of other diseases of the respiratory system     History of sinusitis    Personal history of other diseases of the respiratory system     History of allergic rhinitis    Personal history of other specified conditions 02/02/2021    History of dysuria    Radiculopathy, cervical region     Cervical radiculitis    Unspecified abnormal findings in urine 02/02/2021    Bad odor  of urine       Past Surgical History:   Procedure Laterality Date    APPENDECTOMY  2015    Appendectomy    BACK SURGERY  2015    Lower Back Surgery    CHOLECYSTECTOMY      GALLBLADDER SURGERY  2015    Gallbladder Surgery    OTHER SURGICAL HISTORY  2015    Wrist Surgery    OTHER SURGICAL HISTORY  2022    Hip replacement    OTHER SURGICAL HISTORY  2022    Knee replacement       Family History   Problem Relation Name Age of Onset    Other (cardiac disorder) Mother      Diabetes Mother      Arthritis Father      Other (cardiac disorder) Father      Coronary artery disease Father      Diabetes Father      Other (cardiac disorder) Maternal Grandmother      Diabetes Maternal Grandmother      Other (cardiac disorder) Maternal Grandfather      Diabetes Maternal Grandfather      Arthritis Paternal Grandmother      Other (cadiac disorder) Paternal Grandmother      Diabetes Paternal Grandmother      Arthritis Paternal Grandfather      Other (cardiac disorder) Paternal Grandfather      Diabetes Paternal Grandfather         Social History     Socioeconomic History    Marital status:      Spouse name: Not on file    Number of children: Not on file    Years of education: Not on file    Highest education level: Not on file   Occupational History    Not on file   Tobacco Use    Smoking status: Former     Packs/day: 1.50     Years: 20.00     Additional pack years: 0.00     Total pack years: 30.00     Types: Cigarettes     Quit date:      Years since quittin.8    Smokeless tobacco: Never   Vaping Use    Vaping Use: Never used   Substance and Sexual Activity    Alcohol use: Not Currently     Alcohol/week: 30.0 standard drinks of alcohol     Types: 30 Cans of beer per week     Comment: 30 PER WEEKEND    Drug use: Never    Sexual activity: Not on file   Other Topics Concern    Not on file   Social History Narrative    Not on file     Social Determinants of Health     Financial Resource  Strain: Not on file   Food Insecurity: No Food Insecurity (11/6/2023)    Hunger Vital Sign     Worried About Running Out of Food in the Last Year: Never true     Ran Out of Food in the Last Year: Never true   Transportation Needs: Not on file   Physical Activity: Not on file   Stress: Not on file   Social Connections: Not on file   Intimate Partner Violence: Not on file   Housing Stability: Not on file       Allergies   Allergen Reactions    Neosporin (Neomycin-Polymyx) Hives       Outpatient Encounter Medications as of 11/6/2023   Medication Sig Dispense Refill    atorvastatin (Lipitor) 40 mg tablet Take 1 tablet (40 mg) by mouth once daily at bedtime. 90 tablet 0    cholecalciferol (Vitamin D-3) 25 MCG (1000 UT) tablet Take 1 tablet (25 mcg) by mouth once daily.      folic acid (Folvite) 400 mcg tablet Take 1 tablet (0.4 mg) by mouth once daily.      HYDROcodone-acetaminophen (Norco) 5-325 mg tablet Take 1 tablet by mouth every 6 hours if needed for severe pain (7 - 10). 12 tablet 0    ibuprofen 200 mg tablet Take 3 tablets (600 mg) by mouth 2 times a day as needed for mild pain (1 - 3) or moderate pain (4 - 6).      lisinopril 20 mg tablet Take 1 tablet (20 mg) by mouth once daily. 90 tablet 0    meloxicam (Mobic) 15 mg tablet       omeprazole (PriLOSEC) 40 mg DR capsule Take 1 capsule (40 mg) by mouth once daily. 90 capsule 0    topiramate (Topamax) 100 mg tablet Take 1 tablet (100 mg) by mouth 2 times a day. 180 tablet 0     No facility-administered encounter medications on file as of 11/6/2023.       Physical Exam  Constitutional:       Appearance: Normal appearance. He is obese.   HENT:      Head: Normocephalic and atraumatic.      Mouth/Throat:      Mouth: Mucous membranes are moist.   Eyes:      General: No scleral icterus.     Pupils: Pupils are equal, round, and reactive to light.   Cardiovascular:      Rate and Rhythm: Normal rate and regular rhythm.      Pulses: Normal pulses.      Heart sounds: Normal  heart sounds. No murmur heard.  Pulmonary:      Effort: Pulmonary effort is normal.      Breath sounds: Normal breath sounds.   Musculoskeletal:         General: Normal range of motion.      Cervical back: Normal range of motion.   Skin:     General: Skin is warm and dry.   Neurological:      General: No focal deficit present.      Mental Status: He is alert and oriented to person, place, and time.   Psychiatric:         Mood and Affect: Mood normal.         Thought Content: Thought content normal.             Lab Results   Component Value Date    WBC 6.0 09/01/2023    HGB 13.2 (L) 09/01/2023    HCT 42.6 09/01/2023    MCV 99 09/01/2023     09/01/2023     Lab Results   Component Value Date    GLUCOSE 95 09/01/2023    CALCIUM 9.4 09/01/2023     09/01/2023    K 4.1 09/01/2023    CO2 24 09/01/2023     09/01/2023    BUN 13 09/01/2023    CREATININE 0.86 09/01/2023     CT Chest: dated November 2, 2023: The aorta is not calcified.  His ascending segment measures 4.3 cm at the level of the mid right pulmonary artery.  There has been on change since his last CTA.       Assessment and Plan:   Mr. Surendra Maher is an 53 y.o. male who presents with ascending aortic aneurysm.  This is associated with HTN, HLD, Low Vit D, Obesity and lumbar radiculopathy.  He remains symptom free.  Their imaging is consistent with a stable ascending aortic aneurysm with no growth.  I discussed with him the natural history of aortic aneurysm disease, and the need for follow-up and surveillance. We discussed that hypertension is the leading cause of aneurysmal growth, and he is maintaining his blood pressure in a normal range. I discussed with him that our goal should be a systolic blood pressure of 145 mmHg or less, and a diastolic blood pressure of 80 mmHg or less. We also discussed cardiovascular activities that are permissible and in fact encouraged for weight loss, and the avoidance of heavy lifting and increases in  intrathoracic pressure. We also discussed that current guidelines state a diameter of 55 mm is when we should discuss surgical intervention. Obviously if he has growth of his aneurysm this may need to be performed for later.     Our plan will be for CTA in one year from now for surveillance.

## 2024-01-23 ENCOUNTER — APPOINTMENT (OUTPATIENT)
Dept: ORTHOPEDIC SURGERY | Facility: CLINIC | Age: 54
End: 2024-01-23
Payer: COMMERCIAL

## 2024-01-26 DIAGNOSIS — I10 BENIGN ESSENTIAL HTN: ICD-10-CM

## 2024-01-26 DIAGNOSIS — E78.00 ELEVATED LDL CHOLESTEROL LEVEL: ICD-10-CM

## 2024-01-26 RX ORDER — ATORVASTATIN CALCIUM 40 MG/1
40 TABLET, FILM COATED ORAL NIGHTLY
Qty: 90 TABLET | Refills: 0 | Status: SHIPPED | OUTPATIENT
Start: 2024-01-26 | End: 2024-02-15 | Stop reason: SDUPTHER

## 2024-01-26 RX ORDER — LISINOPRIL 20 MG/1
20 TABLET ORAL DAILY
Qty: 90 TABLET | Refills: 0 | Status: SHIPPED | OUTPATIENT
Start: 2024-01-26 | End: 2024-02-15 | Stop reason: SDUPTHER

## 2024-02-01 ENCOUNTER — APPOINTMENT (OUTPATIENT)
Dept: ORTHOPEDIC SURGERY | Facility: CLINIC | Age: 54
End: 2024-02-01
Payer: COMMERCIAL

## 2024-02-02 ENCOUNTER — OFFICE VISIT (OUTPATIENT)
Dept: ORTHOPEDIC SURGERY | Facility: CLINIC | Age: 54
End: 2024-02-02
Payer: COMMERCIAL

## 2024-02-02 ENCOUNTER — HOSPITAL ENCOUNTER (OUTPATIENT)
Dept: RADIOLOGY | Facility: CLINIC | Age: 54
Discharge: HOME | End: 2024-02-02
Payer: COMMERCIAL

## 2024-02-02 VITALS — WEIGHT: 315 LBS | HEIGHT: 70 IN | BODY MASS INDEX: 45.1 KG/M2

## 2024-02-02 DIAGNOSIS — M79.642 PAIN OF LEFT HAND: ICD-10-CM

## 2024-02-02 DIAGNOSIS — M25.551 RIGHT HIP PAIN: ICD-10-CM

## 2024-02-02 DIAGNOSIS — M16.11 PRIMARY LOCALIZED OSTEOARTHRITIS OF RIGHT HIP: Primary | ICD-10-CM

## 2024-02-02 DIAGNOSIS — E66.01 MORBID OBESITY DUE TO EXCESS CALORIES (MULTI): ICD-10-CM

## 2024-02-02 DIAGNOSIS — G56.21 CUBITAL TUNNEL SYNDROME ON RIGHT: Primary | ICD-10-CM

## 2024-02-02 PROCEDURE — 99213 OFFICE O/P EST LOW 20 MIN: CPT | Performed by: ORTHOPAEDIC SURGERY

## 2024-02-02 PROCEDURE — 1036F TOBACCO NON-USER: CPT | Performed by: ORTHOPAEDIC SURGERY

## 2024-02-02 PROCEDURE — 99214 OFFICE O/P EST MOD 30 MIN: CPT | Performed by: PHYSICIAN ASSISTANT

## 2024-02-02 PROCEDURE — 3008F BODY MASS INDEX DOCD: CPT | Performed by: PHYSICIAN ASSISTANT

## 2024-02-02 PROCEDURE — 1036F TOBACCO NON-USER: CPT | Performed by: PHYSICIAN ASSISTANT

## 2024-02-02 PROCEDURE — 3008F BODY MASS INDEX DOCD: CPT | Performed by: ORTHOPAEDIC SURGERY

## 2024-02-02 PROCEDURE — 73130 X-RAY EXAM OF HAND: CPT | Mod: RT

## 2024-02-02 PROCEDURE — 73130 X-RAY EXAM OF HAND: CPT | Mod: RIGHT SIDE | Performed by: RADIOLOGY

## 2024-02-02 PROCEDURE — 73502 X-RAY EXAM HIP UNI 2-3 VIEWS: CPT | Mod: RIGHT SIDE | Performed by: RADIOLOGY

## 2024-02-02 PROCEDURE — 73502 X-RAY EXAM HIP UNI 2-3 VIEWS: CPT | Mod: RT

## 2024-02-02 RX ORDER — MELOXICAM 15 MG/1
15 TABLET ORAL DAILY
Qty: 60 TABLET | Refills: 0 | Status: SHIPPED | OUTPATIENT
Start: 2024-02-02 | End: 2024-04-02

## 2024-02-02 ASSESSMENT — ENCOUNTER SYMPTOMS
DIFFICULTY URINATING: 0
ENDOCRINE NEGATIVE: 1
NAUSEA: 0
ALLERGIC/IMMUNOLOGIC NEGATIVE: 1
PALPITATIONS: 0
BLOOD IN STOOL: 0
CHILLS: 0
WEAKNESS: 0
CARDIOVASCULAR NEGATIVE: 1
VOMITING: 0
WOUND: 0
DYSURIA: 0
AGITATION: 0
BACK PAIN: 0
HEMATOLOGIC/LYMPHATIC NEGATIVE: 1
NERVOUS/ANXIOUS: 0
COUGH: 0
ACTIVITY CHANGE: 0
HEMATURIA: 0
SHORTNESS OF BREATH: 0
ARTHRALGIAS: 0
RESPIRATORY NEGATIVE: 1
CONSTITUTIONAL NEGATIVE: 1
FREQUENCY: 0
FATIGUE: 0
CHEST TIGHTNESS: 0
LIGHT-HEADEDNESS: 0
COLOR CHANGE: 0
DIZZINESS: 0
WHEEZING: 0
CONFUSION: 0
ABDOMINAL PAIN: 0
FEVER: 0
EYES NEGATIVE: 1
JOINT SWELLING: 1

## 2024-02-02 ASSESSMENT — PAIN SCALES - GENERAL: PAINLEVEL_OUTOF10: 10 - WORST POSSIBLE PAIN

## 2024-02-02 ASSESSMENT — PAIN DESCRIPTION - DESCRIPTORS: DESCRIPTORS: ACHING;SHOOTING

## 2024-02-02 ASSESSMENT — PAIN - FUNCTIONAL ASSESSMENT: PAIN_FUNCTIONAL_ASSESSMENT: 0-10

## 2024-02-02 NOTE — LETTER
February 17, 2024     ROSI Holman-CNP  [address] [place] [address] [address]  [address]  [facility] [address]    Patient: Surendra Maher   YOB: 1970   Date of Visit: 2/2/2024       Dear Dr. Yulia Alcantara, ROSI-CNP:    Thank you for referring Surendra Maher to me for evaluation. Below are my notes for this consultation.  If you have questions, please do not hesitate to call me. I look forward to following your patient along with you.       Sincerely,     Emil Boyer MD      CC: No Recipients  ______________________________________________________________________________________    CHIEF COMPLAINT         Left hand pain and swelling    ASSESSMENT + PLAN     Left hand pain and swelling, resolved    It is difficult to know what this was, as it is gone at this point.  We discussed options for management of recurrence including heat or ice and anti-inflammatories or Tylenol.  At this point I would suggest simply advancing activity as symptoms allow, with no particular restrictions.        3-1/2 months postop from right in situ ulnar nerve decompression with residual clawing    We had a long discussion about the expected time course of resolution of the nerve symptoms on the right.  I reviewed how to watch for worsening of the clawing, which should prompt a return to clinic.  The incision has healed.  Advance activity as symptoms allow on this side as well.        HISTORY OF PRESENT ILLNESS       Patient returns today, now 3-1/2 months from right in situ ulnar nerve decompression at the elbow.  He had some significant swelling and discomfort at the left wrist and hand last week.  There was no single specific recalled trauma.  He simply woke with it one day.  There was no particular numbness or tingling.  The pain was aching in character and did not radiate.  Over the last couple of days it has completely resolved.    He has continued missing sensation and mild call on the right following the in situ ulnar nerve  decompression.  He had some questions about this as well.      PHYSICAL EXAM       He remains well-developed, well-nourished morbidly obese male in no acute distress.  He appears his stated age and has a pleasant affect.  Skin of the left hand, wrist, forearm is intact with no erythema, ecchymosis, diffuse swelling.  Full composite finger flexion extension with good intrinsic plus minus posture.  No obvious swelling.  No discomfort with wrist motion.  Sensation intact to light touch in all distributions.  Capillary refill less than 2 seconds.    Right elbow has well-healed cubital tunnel scar with no particular Tinel's sign.  No worsening of the tingling with elbow flexion test.  Ulnar nerve is stable in the groove.  Cervical range of motion remains good.  There is a mild claw posture of the small finger, but not ring.  This does reverse with Pamela maneuver.  Mild Wartenberg's sign.  Sensation is intact to light touch by blinded confrontation but remains significantly decreased in the small and ulnar ring distribution.  Capillary refill less than 2 seconds.  2+ radial and ulnar pulses.      IMAGING / LABS / EMGs        X-rays right hand ordered and independently interpreted by me today show minimal osteoarthritic change at the long MP joint.  Otherwise no acute fracture, subluxation, or foreign body.  Other joints are concentric and well preserved.      Electronically Signed      ADAM Boyer MD      Orthopaedic Hand Surgery      [phone]

## 2024-02-02 NOTE — PROGRESS NOTES
CHIEF COMPLAINT         Left hand pain and swelling    ASSESSMENT + PLAN     Left hand pain and swelling, resolved    It is difficult to know what this was, as it is gone at this point.  We discussed options for management of recurrence including heat or ice and anti-inflammatories or Tylenol.  At this point I would suggest simply advancing activity as symptoms allow, with no particular restrictions.        3-1/2 months postop from right in situ ulnar nerve decompression with residual clawing    We had a long discussion about the expected time course of resolution of the nerve symptoms on the right.  I reviewed how to watch for worsening of the clawing, which should prompt a return to clinic.  The incision has healed.  Advance activity as symptoms allow on this side as well.        HISTORY OF PRESENT ILLNESS       Patient returns today, now 3-1/2 months from right in situ ulnar nerve decompression at the elbow.  He had some significant swelling and discomfort at the left wrist and hand last week.  There was no single specific recalled trauma.  He simply woke with it one day.  There was no particular numbness or tingling.  The pain was aching in character and did not radiate.  Over the last couple of days it has completely resolved.    He has continued missing sensation and mild call on the right following the in situ ulnar nerve decompression.  He had some questions about this as well.      PHYSICAL EXAM       He remains well-developed, well-nourished morbidly obese male in no acute distress.  He appears his stated age and has a pleasant affect.  Skin of the left hand, wrist, forearm is intact with no erythema, ecchymosis, diffuse swelling.  Full composite finger flexion extension with good intrinsic plus minus posture.  No obvious swelling.  No discomfort with wrist motion.  Sensation intact to light touch in all distributions.  Capillary refill less than 2 seconds.    Right elbow has well-healed cubital tunnel  scar with no particular Tinel's sign.  No worsening of the tingling with elbow flexion test.  Ulnar nerve is stable in the groove.  Cervical range of motion remains good.  There is a mild claw posture of the small finger, but not ring.  This does reverse with Pamela maneuver.  Mild Wartenberg's sign.  Sensation is intact to light touch by blinded confrontation but remains significantly decreased in the small and ulnar ring distribution.  Capillary refill less than 2 seconds.  2+ radial and ulnar pulses.      IMAGING / LABS / EMGs        X-rays right hand ordered and independently interpreted by me today show minimal osteoarthritic change at the long MP joint.  Otherwise no acute fracture, subluxation, or foreign body.  Other joints are concentric and well preserved.      Electronically Signed      ADAM Boyer MD      Orthopaedic Hand Surgery      [phone]

## 2024-02-02 NOTE — PROGRESS NOTES
STEPHANE Grissom, MYRON, ATC  Adult Reconstruction and Joint Replacement Surgery  Phone: [phone]     Fax:[phone]          Chief Complaint   Patient presents with    Left Hip - Pain       HPI    Surendra Maher is a pleasant 54 y.o. year-old male who is seen today for evaluation of right hip pain. The pain has been present for .1year(s).   They do not associate with a traumatic injury.  The patient states that the pain is located in the groin, buttock . The patient denies any history of inflammatory arthritis. The patient notes significant loss in range of motion making it difficult to cross legs and don socks/shoes, and get in an out of a car.  The patient has tried activity modification, prescription medication, and corticosteroid injections without sufficient relief of symptoms. The patient reports a significant reduction in quality of life and activities of daily living.  The pain occasionally wake the patient from sleep. The Pt can only walk short distances before stopping to rest secondary to hip pain. He had a cortisone injection by pain mgmt 6 months ago without relief.    History of Hip Surgery Right Total Knee Arthroplasty    Pain level: 5  Aggravating Factors squatting, putting shoes and socks on, and getting in and out of a car  Alleviating Factors  none  Back pain: No  Radicular symptoms kwside: No    Review of Systems   Constitutional: Negative.  Negative for activity change, chills, fatigue and fever.   HENT: Negative.     Eyes: Negative.    Respiratory: Negative.  Negative for cough, chest tightness, shortness of breath and wheezing.    Cardiovascular: Negative.  Negative for palpitations.   Gastrointestinal:  Negative for abdominal pain, blood in stool, nausea and vomiting.   Endocrine: Negative.  Negative for cold intolerance and polyuria.   Genitourinary: Negative.  Negative for difficulty urinating, dysuria, frequency, hematuria and urgency.   Musculoskeletal:  Positive for  gait problem and joint swelling. Negative for arthralgias and back pain.   Skin: Negative.  Negative for color change, pallor, rash and wound.   Allergic/Immunologic: Negative.  Negative for environmental allergies.   Neurological:  Negative for dizziness, weakness and light-headedness.   Hematological: Negative.    Psychiatric/Behavioral:  Negative for agitation, confusion and suicidal ideas. The patient is not nervous/anxious.    All other systems reviewed and are negative.      There were no vitals filed for this visit.    Past Medical History:   Diagnosis Date    Abdominal distension (gaseous) 02/02/2021    Bloating    Aftercare following joint replacement surgery 02/02/2021    Aftercare following right knee joint replacement surgery    Aortic aneurysm (CMS/HCC)     Dorsopathy, unspecified     Back problem    Encounter for other preprocedural examination 05/15/2020    Preoperative clearance    Encounter for screening for lipoid disorders 02/02/2021    Screening for lipid disorders    Encounter for screening for malignant neoplasm of colon 02/02/2021    Screening for colon cancer    Other cervical disc degeneration, unspecified cervical region     Degenerative disc disease, cervical    Pain in left hip     Hip pain, left    Pain in right hip     Hip pain, right    Personal history of other diseases of the circulatory system     History of hypertension    Personal history of other diseases of the digestive system     History of esophageal reflux    Personal history of other diseases of the musculoskeletal system and connective tissue     History of arthritis    Personal history of other diseases of the nervous system and sense organs     History of otitis media    Personal history of other diseases of the respiratory system     History of sinusitis    Personal history of other diseases of the respiratory system     History of allergic rhinitis    Personal history of other specified conditions 02/02/2021    History  of dysuria    Radiculopathy, cervical region     Cervical radiculitis    Unspecified abnormal findings in urine 02/02/2021    Bad odor of urine     Patient Active Problem List   Diagnosis    Benign essential HTN    Cholelithiasis    Chronic low back pain    Degenerative joint disease (DJD) of hip    ED (erectile dysfunction)    Elevated coronary artery calcium score    Elevated LDL cholesterol level    Epigastric pain    Eustachian tube dysfunction    Fatigue    Gastroesophageal reflux disease without esophagitis    Incidental lung nodule    Chronic hip pain    Low vitamin D level    Lumbar radiculopathy    Macrocytosis without anemia    Obesity    Positional lightheadedness    Primary localized osteoarthrosis of left hip    Primary osteoarthritis of right knee    Rhinitis    Spinal stenosis    Status post left hip replacement    Thoracic aortic aneurysm without rupture (CMS/HCC)    Urinary hesitancy    Abdominal bloating    History of dysuria    Otitis media resolved    H/O acute bacterial sinusitis    Cervical radiculitis    Cubital tunnel syndrome on right       Medication Documentation Review Audit       Reviewed by Alexandra Salomon LPN (Licensed Nurse) on 02/02/24 at 0749      Medication Order Taking? Sig Documenting Provider Last Dose Status   atorvastatin (Lipitor) 40 mg tablet [id number] Yes Take 1 tablet (40 mg) by mouth once daily at bedtime. Yulia Alcantara, APRN-CNP Taking Active   cholecalciferol (Vitamin D-3) 25 MCG (1000 UT) tablet [id number] Yes Take 1 tablet (25 mcg) by mouth once daily. Historical Provider, MD Taking Active   folic acid (Folvite) 400 mcg tablet [id number] Yes Take 1 tablet (0.4 mg) by mouth once daily. Historical Provider, MD Taking Active   HYDROcodone-acetaminophen (Norco) 5-325 mg tablet [id number] Yes Take 1 tablet by mouth every 6 hours if needed for severe pain (7 - 10). Edmond Soto MD Taking Active   ibuprofen 200 mg tablet [id number] Yes Take 3 tablets (600 mg) by mouth 2 times a  day as needed for mild pain (1 - 3) or moderate pain (4 - 6). Historical Provider, MD Taking Active   lisinopril 20 mg tablet [id number] Yes Take 1 tablet (20 mg) by mouth once daily. ROSI Holman-CNP Taking Active   meloxicam (Mobic) 15 mg tablet [id number] Yes  Historical Provider, MD Taking Active   omeprazole (PriLOSEC) 40 mg DR capsule [id number] Yes Take 1 capsule (40 mg) by mouth once daily. ROSI Holman-CNP Taking Active   topiramate (Topamax) 100 mg tablet [id number] Yes Take 1 tablet (100 mg) by mouth 2 times a day. MICHELLE Holman Taking Active                    Allergies   Allergen Reactions    Neosporin (Neomycin-Polymyx) Hives       Social History     Socioeconomic History    Marital status:      Spouse name: Not on file    Number of children: Not on file    Years of education: Not on file    Highest education level: Not on file   Occupational History    Not on file   Tobacco Use    Smoking status: Former     Packs/day: 1.50     Years: 20.00     Additional pack years: 0.00     Total pack years: 30.00     Types: Cigarettes     Quit date:      Years since quittin.0    Smokeless tobacco: Never   Vaping Use    Vaping Use: Never used   Substance and Sexual Activity    Alcohol use: Not Currently     Alcohol/week: 30.0 standard drinks of alcohol     Types: 30 Cans of beer per week     Comment: 30 PER WEEKEND    Drug use: Never    Sexual activity: Not on file   Other Topics Concern    Not on file   Social History Narrative    Not on file     Social Determinants of Health     Financial Resource Strain: Not on file   Food Insecurity: No Food Insecurity (2023)    Hunger Vital Sign     Worried About Running Out of Food in the Last Year: Never true     Ran Out of Food in the Last Year: Never true   Transportation Needs: Not on file   Physical Activity: Not on file   Stress: Not on file   Social Connections: Not on file   Intimate Partner Violence: Not on file   Housing  Stability: Not on file       Past Surgical History:   Procedure Laterality Date    APPENDECTOMY  05/19/2015    Appendectomy    BACK SURGERY  05/19/2015    Lower Back Surgery    CHOLECYSTECTOMY      GALLBLADDER SURGERY  05/19/2015    Gallbladder Surgery    OTHER SURGICAL HISTORY  05/19/2015    Wrist Surgery    OTHER SURGICAL HISTORY  11/09/2022    Hip replacement    OTHER SURGICAL HISTORY  11/09/2022    Knee replacement       Body mass index is 46.14 kg/m².    Physical Exam  side: right Hip:  General: Well-appearing male in no acute distress.  Awake, alert and oriented.  Pleasant and cooperative.  Respiratory: Non-labored breathing  Mood: Euthymic   Gait: Antalgic  Assistive Device: no device  Skin: warm, dry and intact without lesions    Range of Motion:    Flexion: 90   Extension 0  internal rotation: 20   external rotation: 20   abduction: 30  Hip Flexor Strength: 5/5  Abductor Strength: 5/5  Adductor Strength: 5/5  Limb Length Discrepancy: No  Positive Stinchfield test.  Positive Log Roll  There is a negative Straight Leg Raise.  Tenderness: Groin  SILT in a carlos/saph/per/tib distribution  Motor function: Able to fire TA, EHL, G/S  Pulses: Palpable DP/PTpulse      side: left Hip: Unaffected  Range of Motion:   Flexion: 120  Extension: 0  Internal Rotation: 20  External Rotation: 40  Abduction: 35  Hip Flexor Strength: 5/5  Abductor Strength: 5/5  Adductor Strength: 5/5  negative Stinchfield test.  negative Log Roll  There is a negative Straight Leg Raise.  Tenderness: None  SILT in a carlos/saph/per/tib distribution  Motor function: Able to fire TA, EHL, G/S  Pulses: Palpable DP/PT Pulse    Imaging:   No images are attached to the encounter.    3 view Hip and Pelvis were reviewed and interpreted in clinic today. The findings were reviewed with the patient. There are Advanced degenerative changes with associated joint space narrowing, subchondral sclerosis, and osteophyte formation and cystic changes. No evidence of  fracture, AVN, dislocation, osteomyelitis.   Left hip. Well fixed, well aligned LTHA. No evidence of loosening or failure.  Will obtain new xrays today on way out.    Impression/Plan:    Surendra Maher and I discussed the etiology of symptoms.  We discussed in detail a treatment plan that he/she is comfortable with.    severe Osteoarthritis side: right Hip. We reviewed an evidence-based approach to osteoarthritis of the hip.    I discussed non-operative treatments for the patients' arthritis in detail and briefly discussed indications for surgery. The patient should try and delay joint replacement surgery for as long as possible. If the patients' joint problem affects their quality of life and cause significant restrictions of their activities, they may want to consider joint replacement surgery. I reviewed the importance of adopting a low impact lifestyle and avoidance of joint overloading activities. We discussed the role of weight related issues and anti-inflammatories, including their risks, and need for regular monitoring for side effects of these medications. We also discussed the role of physical therapy to aide in hip and core strengthening.  Corticosteroids play a role to temporarily aid in pain relief as an additional option and discussed associated risks. A referral to sports medicine will need to be made, if the patient elects to undergo an ultrasound guided hip injection.    The patient has elected to try activity modification and prescription medication . All questions were answered.    2. Morbid obesity -We discussed at length that he needs to loose 42 pounds prior to surgery.  He would like to do this on his own. Meloxicam was prescribed today. He will follow-up when he is within 15 pounds of weight loss.    Follow-up  jose luis Kimbrough, MPAS, PA-C, ATC  Orthopedic Physician Assisant  Adult Reconstruction and Total Joint Replacement  General Orthopedics  Department of Orthopaedic  Surgery  [facility]  [id number] [place], [place] [id number]  Rosie messaging preferred

## 2024-02-15 ENCOUNTER — OFFICE VISIT (OUTPATIENT)
Dept: PRIMARY CARE | Facility: CLINIC | Age: 54
End: 2024-02-15
Payer: COMMERCIAL

## 2024-02-15 VITALS
DIASTOLIC BLOOD PRESSURE: 66 MMHG | HEART RATE: 94 BPM | SYSTOLIC BLOOD PRESSURE: 95 MMHG | BODY MASS INDEX: 44.38 KG/M2 | WEIGHT: 310 LBS | HEIGHT: 70 IN

## 2024-02-15 DIAGNOSIS — K21.9 GASTROESOPHAGEAL REFLUX DISEASE WITHOUT ESOPHAGITIS: ICD-10-CM

## 2024-02-15 DIAGNOSIS — I10 BENIGN ESSENTIAL HTN: ICD-10-CM

## 2024-02-15 DIAGNOSIS — E78.00 ELEVATED LDL CHOLESTEROL LEVEL: ICD-10-CM

## 2024-02-15 DIAGNOSIS — R79.89 LOW VITAMIN D LEVEL: Primary | ICD-10-CM

## 2024-02-15 PROBLEM — G89.29 CHRONIC BACK PAIN GREATER THAN 3 MONTHS DURATION: Status: ACTIVE | Noted: 2020-06-02

## 2024-02-15 PROBLEM — Z86.79 HISTORY OF HYPERTENSION: Status: ACTIVE | Noted: 2024-02-15

## 2024-02-15 PROBLEM — K92.9 DISORDER OF DIGESTIVE TRACT: Status: ACTIVE | Noted: 2024-02-15

## 2024-02-15 PROBLEM — Z86.69 HISTORY OF OTITIS MEDIA: Status: ACTIVE | Noted: 2024-02-15

## 2024-02-15 PROBLEM — Z86.69 OTITIS MEDIA RESOLVED: Status: RESOLVED | Noted: 2023-09-08 | Resolved: 2024-02-15

## 2024-02-15 PROBLEM — S49.91XA INJURY OF RIGHT SHOULDER: Status: ACTIVE | Noted: 2024-02-15

## 2024-02-15 PROBLEM — E55.9 VITAMIN D DEFICIENCY: Status: ACTIVE | Noted: 2024-02-15

## 2024-02-15 PROBLEM — M25.529 ELBOW PAIN: Status: ACTIVE | Noted: 2024-02-15

## 2024-02-15 PROBLEM — R06.02 SHORTNESS OF BREATH: Status: ACTIVE | Noted: 2024-02-15

## 2024-02-15 PROBLEM — M54.9 CHRONIC BACK PAIN GREATER THAN 3 MONTHS DURATION: Status: ACTIVE | Noted: 2020-06-02

## 2024-02-15 PROBLEM — M50.30 DEGENERATION OF INTERVERTEBRAL DISC OF CERVICAL REGION: Status: ACTIVE | Noted: 2024-02-15

## 2024-02-15 PROBLEM — R14.2 BURPING: Status: ACTIVE | Noted: 2024-02-15

## 2024-02-15 PROCEDURE — 3078F DIAST BP <80 MM HG: CPT | Performed by: NURSE PRACTITIONER

## 2024-02-15 PROCEDURE — 3008F BODY MASS INDEX DOCD: CPT | Performed by: NURSE PRACTITIONER

## 2024-02-15 PROCEDURE — 3074F SYST BP LT 130 MM HG: CPT | Performed by: NURSE PRACTITIONER

## 2024-02-15 PROCEDURE — 99214 OFFICE O/P EST MOD 30 MIN: CPT | Performed by: NURSE PRACTITIONER

## 2024-02-15 PROCEDURE — 1036F TOBACCO NON-USER: CPT | Performed by: NURSE PRACTITIONER

## 2024-02-15 RX ORDER — CHOLECALCIFEROL (VITAMIN D3) 25 MCG
1000 TABLET ORAL DAILY
Qty: 90 TABLET | Refills: 1 | Status: SHIPPED | OUTPATIENT
Start: 2024-02-15

## 2024-02-15 RX ORDER — OMEPRAZOLE 40 MG/1
40 CAPSULE, DELAYED RELEASE ORAL DAILY
Qty: 90 CAPSULE | Refills: 1 | Status: SHIPPED | OUTPATIENT
Start: 2024-02-15

## 2024-02-15 RX ORDER — ATORVASTATIN CALCIUM 40 MG/1
40 TABLET, FILM COATED ORAL NIGHTLY
Qty: 90 TABLET | Refills: 1 | Status: SHIPPED | OUTPATIENT
Start: 2024-02-15 | End: 2024-05-03 | Stop reason: SDUPTHER

## 2024-02-15 RX ORDER — LISINOPRIL 20 MG/1
20 TABLET ORAL DAILY
Qty: 90 TABLET | Refills: 1 | Status: SHIPPED | OUTPATIENT
Start: 2024-02-15 | End: 2024-05-07 | Stop reason: SDUPTHER

## 2024-02-15 ASSESSMENT — ENCOUNTER SYMPTOMS
LOSS OF SENSATION IN FEET: 0
OCCASIONAL FEELINGS OF UNSTEADINESS: 1
DEPRESSION: 0

## 2024-02-15 NOTE — PROGRESS NOTES
"Subjective   Patient ID: Surendra Maher is a 54 y.o. male who presents for Follow-up and Hypertension.    HPI     1. Hypertension  2. Hyperlipidemia  Current meds: atorvastatin 40 mg every day, lisinopril 20 mg every day   Home blood pressure monitoring: none  Salt intake: limits   Caffeine intake: 1 cup of tea per day, occasional latte   Diet: getting healthier, has been limiting carbs; has lost 11 lbs in 2 weeks   Exercise: none due to his hip and back pain   Alcohol use: has cut back, now just a couple beers per week   Tobacco use: none  Illicit drug use: none    Occasional leg swelling at the end of a long day of work. Denies vision changes, headaches, dizziness, chest pain, palpitations, or edema.    3. GERD  Well controlled when he takes omeprazole  Usually gets some heartburn with red sauce   Does belch frequently but denies nausea, vomiting, diarrhea, abdominal pain, or blood in his stool    Review of Systems  ROS negative except as noted above in HPI.     Objective   BP 95/66   Pulse 94   Ht 1.778 m (5' 10\")   Wt 141 kg (310 lb)   BMI 44.48 kg/m²     Physical Exam  General: Alert and oriented, in no acute distress. Appears stated age, well-nourished, and well hydrated  HEENT:  - Head: Normocephalic and atraumatic   - Eyes: EOMI, PERRLA  - ENT: Hearing grossly intact  Heart: RRR. No murmurs, clicks, or rubs  Lungs: Unlabored breathing. CTAB with no crackles, wheezes, or rhonchi  Abdomen: Normal BS in all 4 quadrants. Soft, non-tender, non-distended, with no masses  Extremities: Warm and well perfused. No edema. Normal peripheral pulses  Musculoskeletal: Normal gait and station  Neurological: Alert and oriented. No gross neurological deficits  Psychological: Appropriate mood and affect  Skin: No rash, abnormal lesions, cyanosis, or erythema      Assessment/Plan   1. Hypertension, goal <130/80  - Well controlled  - Continue lisinopril 20 mg every day  - Monitor BP at home  - Lifestyle management    2. " Hyerlipidemia  - Lipids WNL 9/2023  - Continue atorvastatin 40 mg every day   - Lifestyle management    3. GERD  - Well controlled  - Continue omeprazole 40 mg every day  - Avoidance of known triggers    RTC in 6 months for physical or sooner ROSI Corrales-CNP  [facility]

## 2024-04-09 DIAGNOSIS — G89.29 CHRONIC LOW BACK PAIN, UNSPECIFIED BACK PAIN LATERALITY, UNSPECIFIED WHETHER SCIATICA PRESENT: ICD-10-CM

## 2024-04-09 DIAGNOSIS — M54.50 CHRONIC LOW BACK PAIN, UNSPECIFIED BACK PAIN LATERALITY, UNSPECIFIED WHETHER SCIATICA PRESENT: ICD-10-CM

## 2024-04-09 RX ORDER — TOPIRAMATE 100 MG/1
100 TABLET, FILM COATED ORAL 2 TIMES DAILY
Qty: 180 TABLET | Refills: 0 | Status: SHIPPED | OUTPATIENT
Start: 2024-04-09 | End: 2024-04-23 | Stop reason: SDUPTHER

## 2024-04-23 ENCOUNTER — OFFICE VISIT (OUTPATIENT)
Dept: PAIN MEDICINE | Facility: HOSPITAL | Age: 54
End: 2024-04-23
Payer: COMMERCIAL

## 2024-04-23 DIAGNOSIS — G89.29 CHRONIC LOW BACK PAIN, UNSPECIFIED BACK PAIN LATERALITY, UNSPECIFIED WHETHER SCIATICA PRESENT: ICD-10-CM

## 2024-04-23 DIAGNOSIS — M54.50 CHRONIC LOW BACK PAIN, UNSPECIFIED BACK PAIN LATERALITY, UNSPECIFIED WHETHER SCIATICA PRESENT: ICD-10-CM

## 2024-04-23 DIAGNOSIS — M54.16 LUMBAR RADICULOPATHY: Primary | ICD-10-CM

## 2024-04-23 PROCEDURE — 99214 OFFICE O/P EST MOD 30 MIN: CPT | Performed by: ANESTHESIOLOGY

## 2024-04-23 PROCEDURE — 3008F BODY MASS INDEX DOCD: CPT | Performed by: ANESTHESIOLOGY

## 2024-04-23 RX ORDER — TOPIRAMATE 100 MG/1
100 TABLET, FILM COATED ORAL 2 TIMES DAILY
Qty: 180 TABLET | Refills: 3 | Status: SHIPPED | OUTPATIENT
Start: 2024-04-23

## 2024-04-23 ASSESSMENT — PAIN SCALES - GENERAL: PAINLEVEL: 6

## 2024-04-23 NOTE — PROGRESS NOTES
Pain Management Clinic Note   Chief Complaint   Patient presents with    Back Pain       History Of Present Illness  Surendra Maher is a 54 y.o. male with a past medical history of hypertension, hyperlipidemia, GERD, chronic, osteoarthritis, lumbar radiculopathy is presenting for evaluation of low back pain.  Patient was last seen in May 2023, at that time patient received an L4-5 lumbar epidural steroid injection for lumbar radiculitis.  Patient says the epidural injections have given him more than 75% reduction in his pain and he has ongoing relief since May 2023.    Today the patient is coming in for evaluation appointment, and medication refill.  He states that he has had and is very well-controlled.    He is and he was surprised at how long it has been since his last injection has been okay with such good Pain control.  He states his low back pain 2 or 3 out of 10  in severity.  He states he is very busy at work, currently working on moving so given all the extra stress put on his back, he is very happy with his current pain level. He does notice it is worse after long days but is not very bothersome.     He does not think he needs any repeat epidural injections at this time.    Most recent imaging 2/2/24:  XR Hip Right:   FINDINGS:  No acute fracture or malalignment.  Severe right hip osteoarthrosis with joint space loss and osteophytes.  Left hip arthroplasty without hardware complication.  Advanced appearing lower lumbar spine degenerative changes.  Soft tissues are within normal limits.    Previous treatments include:   Medications: Topiramate 100mg BID,    Physical Therapy: Previous   Injections: Yes, L4/L5 CHRISTINA    The pain causes significant stress in the patient's life, specifically interferes with general activity, mood, walking ability, ability to perform tasks at home and/or work.  Patient participates in physical therapy and continues to perform physician directed exercises at home. Denies any bowel  or bladder incontinence, saddle anesthesia, worsening pain, weakness or falls.     Past Medical History  He has a past medical history of Abdominal distension (gaseous) (02/02/2021), Aftercare following joint replacement surgery (02/02/2021), Aortic aneurysm (CMS-HCC), Dorsopathy, unspecified, Encounter for other preprocedural examination (05/15/2020), Encounter for screening for lipoid disorders (02/02/2021), Encounter for screening for malignant neoplasm of colon (02/02/2021), Other cervical disc degeneration, unspecified cervical region, Pain in left hip, Pain in right hip, Personal history of other diseases of the circulatory system, Personal history of other diseases of the digestive system, Personal history of other diseases of the musculoskeletal system and connective tissue, Personal history of other diseases of the nervous system and sense organs, Personal history of other diseases of the respiratory system, Personal history of other diseases of the respiratory system, Personal history of other specified conditions (02/02/2021), Radiculopathy, cervical region, and Unspecified abnormal findings in urine (02/02/2021).    Surgical History  He has a past surgical history that includes Back surgery (05/19/2015); Appendectomy (05/19/2015); Other surgical history (05/19/2015); Gallbladder surgery (05/19/2015); Other surgical history (11/09/2022); Other surgical history (11/09/2022); and Cholecystectomy.     Social History  He reports that he quit smoking about 14 years ago. His smoking use included cigarettes. He started smoking about 34 years ago. He has a 30 pack-year smoking history. He has never used smokeless tobacco. He reports that he does not currently use alcohol after a past usage of about 30.0 standard drinks of alcohol per week. He reports that he does not use drugs.    Family History  Family History   Problem Relation Name Age of Onset    Other (cardiac disorder) Mother      Diabetes Mother       Arthritis Father      Other (cardiac disorder) Father      Coronary artery disease Father      Diabetes Father      Other (cardiac disorder) Maternal Grandmother      Diabetes Maternal Grandmother      Other (cardiac disorder) Maternal Grandfather      Diabetes Maternal Grandfather      Arthritis Paternal Grandmother      Other (cadiac disorder) Paternal Grandmother      Diabetes Paternal Grandmother      Arthritis Paternal Grandfather      Other (cardiac disorder) Paternal Grandfather      Diabetes Paternal Grandfather          Allergies  Neosporin (neomycin-polymyx)    Review of Symptoms:   Constitutional: Negative for chills, diaphoresis or fever  HENT: Negative for neck swelling  Eyes:.  Negative for eye pain  Respiratory:.  Negative for cough, shortness of breath or wheezing    Cardiovascular:.  Negative for chest pain or palpitations  Gastrointestinal:.  Negative for abdominal pain, nausea and vomiting  Genitourinary:.  Negative for urgency  Musculoskeletal:  Positive for back pain. Positive for joint pain. Denies falls within the past 3 months.  Skin: Negative for wounds or itching   Neurological: Negative for dizziness, seizures, loss of consciousness and weakness  Endo/Heme/Allergies: Does not bruise/bleed easily  Psychiatric/Behavioral: Negative for depression. The patient does not appear anxious.       Physical Exam  Constitutional:       General: He is not in acute distress.     Appearance: Normal appearance.   HENT:      Head: Normocephalic.   Eyes:      Extraocular Movements: Extraocular movements intact.      Conjunctiva/sclera: Conjunctivae normal.      Pupils: Pupils are equal, round, and reactive to light.   Cardiovascular:      Rate and Rhythm: Normal rate and regular rhythm.   Pulmonary:      Effort: Pulmonary effort is normal. No respiratory distress.   Abdominal:      General: Bowel sounds are normal.      Palpations: Abdomen is soft.   Musculoskeletal:         General: Tenderness present.  Normal range of motion.      Cervical back: Neck supple. No rigidity.      Right lower leg: No edema.      Left lower leg: No edema.   Lymphadenopathy:      Cervical: No cervical adenopathy.   Skin:     General: Skin is warm and dry.   Neurological:      General: No focal deficit present.      Mental Status: He is alert and oriented to person, place, and time.      Cranial Nerves: No cranial nerve deficit.   Psychiatric:         Mood and Affect: Mood normal.         Behavior: Behavior normal.        Advanced Exam   Inspection: No gross deformities, no surgical scars  Palpation: No tenderness of patient of lumbar midline, lumbar paraspinals, bilateral SI joints  ROM: Normal range of motion of the lumbar flexion extension  Motor: 5/5 strength upper and lower extremities  Sensory: Negative for sensory abnormalities in upper and lower extremities  Reflexes: 2+ reflexes bilateral upper and lower extremities     Last Recorded Vitals  There were no vitals taken for this visit.    Relevant Results  Current Outpatient Medications   Medication Instructions    atorvastatin (LIPITOR) 40 mg, oral, Nightly    cholecalciferol (VITAMIN D-3) 1,000 Units, oral, Daily    folic acid (Folvite) 400 mcg tablet 1 tablet, oral, Daily    lisinopril 20 mg, oral, Daily    omeprazole (PRILOSEC) 40 mg, oral, Daily    topiramate (TOPAMAX) 100 mg, oral, 2 times daily         XR hip right with pelvis when performed 2 or 3 views 02/02/2024    Narrative  Interpreted By:  Kinga Loera,  STUDY:  Single view pelvis.  Right hip, two views.    INDICATION:  Signs/Symptoms:Right hip pain.    COMPARISON:  12/08/2017.    ACCESSION NUMBER(S):  [id number]    ORDERING CLINICIAN:  MIKE ACEVEDO    FINDINGS:  No acute fracture or malalignment.  Severe right hip osteoarthrosis with joint space loss and osteophytes.  Left hip arthroplasty without hardware complication.  Advanced appearing lower lumbar spine degenerative changes.  Soft tissues are within normal  limits.    Impression  1. Severe right hip osteoarthrosis. Additional findings as above.    MACRO:  None.    Signed by: Kinga Loera 2/3/2024 9:18 AM  Dictation workstation:   [id number]     No image results found.       No diagnosis found.     ASSESSMENT/PLAN  Surendra Maher is a 54 y.o. male with a past medical history of hypertension, hyperlipidemia, GERD, chronic, osteoarthritis, lumbar radiculopathy is presenting for evaluation of low back pain.   Based on history, available imaging and physical exam, the patient's primary pain etiology is likely due to lumbar radiculopathy and lumbar spinal stenosis.  There is also likely some element of back pain related to the severe osteoarthritis in his right hip.  At this time given the patient's level of pain we can proceed with refilling his medication for a year. He has stated he will call the office if he feels he needs a new injection.      Our plan is as follows:  - Refill Topiramate prescription.  Side effect profile reviewed.  Discussed that he cannot stop this medication abruptly, would need to wean down if we decide to discontinue it down the line.  - Please send a [facility] message if you feel you need another injection.  In the future can repeat an L4-5 intralaminar on an as-needed basis.  We discussed the procedure, risk, benefits, alternatives.  He more than 75% relief for 11 months at this point.  - Continue to participate in physical therapy as well as physician directed home exercises  - Continue pain medications as prescribed       Arik Mattson MD

## 2024-05-01 DIAGNOSIS — M16.11 UNILATERAL PRIMARY OSTEOARTHRITIS, RIGHT HIP: Primary | ICD-10-CM

## 2024-05-01 RX ORDER — MELOXICAM 15 MG/1
15 TABLET ORAL DAILY
Qty: 60 TABLET | Refills: 0 | Status: SHIPPED | OUTPATIENT
Start: 2024-05-01 | End: 2024-06-30

## 2024-05-03 DIAGNOSIS — E78.00 ELEVATED LDL CHOLESTEROL LEVEL: ICD-10-CM

## 2024-05-03 RX ORDER — ATORVASTATIN CALCIUM 40 MG/1
40 TABLET, FILM COATED ORAL NIGHTLY
Qty: 90 TABLET | Refills: 1 | Status: SHIPPED | OUTPATIENT
Start: 2024-05-03

## 2024-05-07 DIAGNOSIS — I10 BENIGN ESSENTIAL HTN: ICD-10-CM

## 2024-05-07 RX ORDER — LISINOPRIL 20 MG/1
20 TABLET ORAL DAILY
Qty: 90 TABLET | Refills: 1 | Status: SHIPPED | OUTPATIENT
Start: 2024-05-07

## 2024-08-16 ENCOUNTER — APPOINTMENT (OUTPATIENT)
Dept: PRIMARY CARE | Facility: CLINIC | Age: 54
End: 2024-08-16
Payer: COMMERCIAL

## 2024-08-16 VITALS
DIASTOLIC BLOOD PRESSURE: 84 MMHG | HEIGHT: 70 IN | HEART RATE: 75 BPM | SYSTOLIC BLOOD PRESSURE: 113 MMHG | WEIGHT: 304.6 LBS | BODY MASS INDEX: 43.61 KG/M2

## 2024-08-16 DIAGNOSIS — Z78.9 HEPATITIS B VACCINATION STATUS UNKNOWN: ICD-10-CM

## 2024-08-16 DIAGNOSIS — G57.93 NEUROPATHY OF BOTH FEET: ICD-10-CM

## 2024-08-16 DIAGNOSIS — Z00.00 ANNUAL PHYSICAL EXAM: Primary | ICD-10-CM

## 2024-08-16 DIAGNOSIS — G89.29 CHRONIC HIP PAIN, UNSPECIFIED LATERALITY: ICD-10-CM

## 2024-08-16 DIAGNOSIS — Z12.5 SCREENING PSA (PROSTATE SPECIFIC ANTIGEN): ICD-10-CM

## 2024-08-16 DIAGNOSIS — K21.9 GASTROESOPHAGEAL REFLUX DISEASE WITHOUT ESOPHAGITIS: ICD-10-CM

## 2024-08-16 DIAGNOSIS — Z12.11 COLON CANCER SCREENING: ICD-10-CM

## 2024-08-16 DIAGNOSIS — I10 BENIGN ESSENTIAL HTN: ICD-10-CM

## 2024-08-16 DIAGNOSIS — E66.01 CLASS 3 SEVERE OBESITY DUE TO EXCESS CALORIES WITH SERIOUS COMORBIDITY AND BODY MASS INDEX (BMI) OF 40.0 TO 44.9 IN ADULT (MULTI): ICD-10-CM

## 2024-08-16 DIAGNOSIS — M25.559 CHRONIC HIP PAIN, UNSPECIFIED LATERALITY: ICD-10-CM

## 2024-08-16 DIAGNOSIS — G56.21 CUBITAL TUNNEL SYNDROME ON RIGHT: ICD-10-CM

## 2024-08-16 DIAGNOSIS — E78.00 ELEVATED LDL CHOLESTEROL LEVEL: ICD-10-CM

## 2024-08-16 PROBLEM — Z87.09: Status: RESOLVED | Noted: 2023-09-08 | Resolved: 2024-08-16

## 2024-08-16 PROBLEM — R39.11 URINARY HESITANCY: Status: RESOLVED | Noted: 2023-09-07 | Resolved: 2024-08-16

## 2024-08-16 PROBLEM — Z87.898 HISTORY OF DYSURIA: Status: RESOLVED | Noted: 2023-09-08 | Resolved: 2024-08-16

## 2024-08-16 PROBLEM — E55.9 VITAMIN D DEFICIENCY: Status: ACTIVE | Noted: 2023-09-07

## 2024-08-16 PROBLEM — R10.13 EPIGASTRIC PAIN: Status: RESOLVED | Noted: 2018-06-13 | Resolved: 2024-08-16

## 2024-08-16 PROBLEM — R06.02 SHORTNESS OF BREATH: Status: RESOLVED | Noted: 2024-02-15 | Resolved: 2024-08-16

## 2024-08-16 PROBLEM — S49.91XA INJURY OF RIGHT SHOULDER: Status: RESOLVED | Noted: 2024-02-15 | Resolved: 2024-08-16

## 2024-08-16 PROBLEM — Z86.69 HISTORY OF OTITIS MEDIA: Status: RESOLVED | Noted: 2024-02-15 | Resolved: 2024-08-16

## 2024-08-16 PROBLEM — R42 POSITIONAL LIGHTHEADEDNESS: Status: RESOLVED | Noted: 2023-09-07 | Resolved: 2024-08-16

## 2024-08-16 PROBLEM — Z86.79 HISTORY OF HYPERTENSION: Status: RESOLVED | Noted: 2024-02-15 | Resolved: 2024-08-16

## 2024-08-16 LAB
BASOPHILS # BLD AUTO: 0.06 X10*3/UL (ref 0–0.1)
BASOPHILS NFR BLD AUTO: 0.9 %
EOSINOPHIL # BLD AUTO: 0.22 X10*3/UL (ref 0–0.7)
EOSINOPHIL NFR BLD AUTO: 3.5 %
ERYTHROCYTE [DISTWIDTH] IN BLOOD BY AUTOMATED COUNT: 13.3 % (ref 11.5–14.5)
EST. AVERAGE GLUCOSE BLD GHB EST-MCNC: 105 MG/DL
HBA1C MFR BLD: 5.3 %
HCT VFR BLD AUTO: 44.3 % (ref 41–52)
HGB BLD-MCNC: 14.2 G/DL (ref 13.5–17.5)
IMM GRANULOCYTES # BLD AUTO: 0.03 X10*3/UL (ref 0–0.7)
IMM GRANULOCYTES NFR BLD AUTO: 0.5 % (ref 0–0.9)
LYMPHOCYTES # BLD AUTO: 1.36 X10*3/UL (ref 1.2–4.8)
LYMPHOCYTES NFR BLD AUTO: 21.4 %
MCH RBC QN AUTO: 31.6 PG (ref 26–34)
MCHC RBC AUTO-ENTMCNC: 32.1 G/DL (ref 32–36)
MCV RBC AUTO: 98 FL (ref 80–100)
MONOCYTES # BLD AUTO: 0.72 X10*3/UL (ref 0.1–1)
MONOCYTES NFR BLD AUTO: 11.3 %
NEUTROPHILS # BLD AUTO: 3.98 X10*3/UL (ref 1.2–7.7)
NEUTROPHILS NFR BLD AUTO: 62.4 %
NRBC BLD-RTO: 0 /100 WBCS (ref 0–0)
PLATELET # BLD AUTO: 247 X10*3/UL (ref 150–450)
RBC # BLD AUTO: 4.5 X10*6/UL (ref 4.5–5.9)
WBC # BLD AUTO: 6.4 X10*3/UL (ref 4.4–11.3)

## 2024-08-16 PROCEDURE — 83036 HEMOGLOBIN GLYCOSYLATED A1C: CPT

## 2024-08-16 PROCEDURE — 80061 LIPID PANEL: CPT

## 2024-08-16 PROCEDURE — 90471 IMMUNIZATION ADMIN: CPT | Performed by: NURSE PRACTITIONER

## 2024-08-16 PROCEDURE — 99396 PREV VISIT EST AGE 40-64: CPT | Performed by: NURSE PRACTITIONER

## 2024-08-16 PROCEDURE — 84153 ASSAY OF PSA TOTAL: CPT

## 2024-08-16 PROCEDURE — 3079F DIAST BP 80-89 MM HG: CPT | Performed by: NURSE PRACTITIONER

## 2024-08-16 PROCEDURE — 82607 VITAMIN B-12: CPT

## 2024-08-16 PROCEDURE — 99214 OFFICE O/P EST MOD 30 MIN: CPT | Performed by: NURSE PRACTITIONER

## 2024-08-16 PROCEDURE — 82306 VITAMIN D 25 HYDROXY: CPT

## 2024-08-16 PROCEDURE — 3074F SYST BP LT 130 MM HG: CPT | Performed by: NURSE PRACTITIONER

## 2024-08-16 PROCEDURE — 80053 COMPREHEN METABOLIC PANEL: CPT

## 2024-08-16 PROCEDURE — 3008F BODY MASS INDEX DOCD: CPT | Performed by: NURSE PRACTITIONER

## 2024-08-16 PROCEDURE — 86706 HEP B SURFACE ANTIBODY: CPT

## 2024-08-16 PROCEDURE — 90715 TDAP VACCINE 7 YRS/> IM: CPT | Performed by: NURSE PRACTITIONER

## 2024-08-16 PROCEDURE — 85025 COMPLETE CBC W/AUTO DIFF WBC: CPT

## 2024-08-16 PROCEDURE — 1036F TOBACCO NON-USER: CPT | Performed by: NURSE PRACTITIONER

## 2024-08-16 PROCEDURE — 84443 ASSAY THYROID STIM HORMONE: CPT

## 2024-08-16 RX ORDER — MELOXICAM 15 MG/1
15 TABLET ORAL DAILY PRN
Qty: 30 TABLET | Refills: 0 | Status: SHIPPED | OUTPATIENT
Start: 2024-08-16

## 2024-08-16 RX ORDER — OMEPRAZOLE 40 MG/1
40 CAPSULE, DELAYED RELEASE ORAL DAILY
Qty: 90 CAPSULE | Refills: 1 | Status: SHIPPED | OUTPATIENT
Start: 2024-08-16

## 2024-08-16 RX ORDER — LISINOPRIL 20 MG/1
20 TABLET ORAL DAILY
Qty: 90 TABLET | Refills: 1 | Status: SHIPPED | OUTPATIENT
Start: 2024-08-16

## 2024-08-16 RX ORDER — ATORVASTATIN CALCIUM 40 MG/1
40 TABLET, FILM COATED ORAL NIGHTLY
Qty: 90 TABLET | Refills: 1 | Status: SHIPPED | OUTPATIENT
Start: 2024-08-16

## 2024-08-16 RX ORDER — MELOXICAM 15 MG/1
TABLET ORAL
COMMUNITY
Start: 2023-07-10 | End: 2024-08-16 | Stop reason: SDUPTHER

## 2024-08-16 ASSESSMENT — ENCOUNTER SYMPTOMS
DEPRESSION: 0
LOSS OF SENSATION IN FEET: 0
OCCASIONAL FEELINGS OF UNSTEADINESS: 0

## 2024-08-16 NOTE — PROGRESS NOTES
Pt is here for for fuv, concerns of med refill, hand numbness and pain in both feet with some warm sensation.

## 2024-08-16 NOTE — PROGRESS NOTES
"Surendra Maher is a 54 y.o. male who is presenting for annual physical exam and chronic care.     1. Hypertension  2. Hyperlipidemia  Current meds: atorvastatin 40 mg every day, lisinopril 20 mg every day   Home blood pressure monitoring: occasionally at the drug store, controlled   Salt intake: limits   Caffeine intake: 1 cup of tea per day, occasional latte   Diet: not great although has been losing weight   Exercise: none due to his hip and back pain   Alcohol use: a couple drinks per night, states he's doing this for his chronic pain  Tobacco use: none  Illicit drug use: none     Denies vision changes, headaches, dizziness, chest pain, palpitations, or edema.     3. GERD  Well controlled when he takes omeprazole  Usually gets some heartburn with red sauce   Denies nausea, vomiting, diarrhea, abdominal pain, or blood in his stool    4. R hand pain   He had right in situ ulnar nerve decompression in October 2024 and he does not feel it is any better  He reports numbness to his right 4th and 5th digits and notes generalized weakness to the hand   He occasionally gets a shooting pain up his arm  He would like a 2nd opinion on this     5. Pain in feet  Reports pain to the bottom of his feet for at least 2 years, seems to have gotten worse in the last year  States they feel \"warm\"  Also notes occasional numbness and tingling to his feet as well  States he had broken his left foot as a child so has pain to that area at times    Last  Visit: 10/2022    Dental, Vision, Hearing:  Regular dental visits: no  - Brushes teeth 2 times per day  - Flosses? no24/  Vision problems: yes  - Wears glasses or contacts? yes, glasses  - Last eye exam:  about 2 years ago  Hearing loss: no    Immunization status:  Up to date: no, needs TDAP. Unsure about Hep B but believes he got this     Lifestyle:  As noted above    Reproductive Health:  Sexually active: no  Erectile dysfunction: yes    Colorectal Cancer Screening:  Last colonoscopy " or Cologuard:  2021  Prostate Cancer Screening:  Last PSA:  10/24/2022  Metabolic Screening:  Lipid profile: 9/1/2023  Glucose screen: 9/1/2023    Review of Systems  Gen: denies fever, chills, weight loss, fatigue  HEENT: denies sinus pressure, sinus congestion, runny nose, red eyes, itchy eyes, vision loss, ear pain, hearing loss, throat pain, trouble swallowing  Neck: denies neck pain, neck swelling or masses  CV: denies chest pain, palpitations, fast heart rate, syncope  Resp: denies shortness of breath, cough, wheezing  GI: denies abdominal pain, nausea, diarrhea, constipation, hematochezia, melena  : denies dysuria, hematuria, penile discharge, frequency  Endo: denies polydipsia, polyuria, heat/cold intolerance, weight change, hair thinning  Heme: denies easy bruising, easy bleeding  Neuro: denies headache, numbness, tingling, memory loss, changes in vision  MSK: as noted in HPI  Psych: stress. denies suicidal ideation, homicidal ideation, depression, anxiety, mood swings  Skin: denies rashes, abnormal lesions, itching, changes in moles     Previous History  Past Medical History:   Diagnosis Date    Abdominal distension (gaseous) 02/02/2021    Bloating    Aftercare following joint replacement surgery 02/02/2021    Aftercare following right knee joint replacement surgery    Aortic aneurysm (CMS-HCC)     Dorsopathy, unspecified     Back problem    Encounter for other preprocedural examination 05/15/2020    Preoperative clearance    Encounter for screening for lipoid disorders 02/02/2021    Screening for lipid disorders    Encounter for screening for malignant neoplasm of colon 02/02/2021    Screening for colon cancer    Other cervical disc degeneration, unspecified cervical region     Degenerative disc disease, cervical    Pain in left hip     Hip pain, left    Pain in right hip     Hip pain, right    Personal history of other diseases of the circulatory system     History of hypertension    Personal history of  other diseases of the digestive system     History of esophageal reflux    Personal history of other diseases of the musculoskeletal system and connective tissue     History of arthritis    Personal history of other diseases of the nervous system and sense organs     History of otitis media    Personal history of other diseases of the respiratory system     History of sinusitis    Personal history of other diseases of the respiratory system     History of allergic rhinitis    Personal history of other specified conditions 2021    History of dysuria    Radiculopathy, cervical region     Cervical radiculitis    Unspecified abnormal findings in urine 2021    Bad odor of urine     Past Surgical History:   Procedure Laterality Date    APPENDECTOMY  2015    Appendectomy    BACK SURGERY  2015    Lower Back Surgery    CHOLECYSTECTOMY      GALLBLADDER SURGERY  2015    Gallbladder Surgery    OTHER SURGICAL HISTORY  2015    Wrist Surgery    OTHER SURGICAL HISTORY  2022    Hip replacement    OTHER SURGICAL HISTORY  2022    Knee replacement     Social History     Tobacco Use    Smoking status: Former     Current packs/day: 0.00     Average packs/day: 1.5 packs/day for 20.0 years (30.0 ttl pk-yrs)     Types: Cigarettes     Start date:      Quit date:      Years since quittin.6    Smokeless tobacco: Never   Vaping Use    Vaping status: Never Used   Substance Use Topics    Alcohol use: Not Currently     Alcohol/week: 30.0 standard drinks of alcohol     Types: 30 Cans of beer per week     Comment: 30 PER WEEKEND    Drug use: Never     Family History   Problem Relation Name Age of Onset    Other (cardiac disorder) Mother      Diabetes Mother      Arthritis Father      Other (cardiac disorder) Father      Coronary artery disease Father      Diabetes Father      Other (cardiac disorder) Maternal Grandmother      Diabetes Maternal Grandmother      Other (cardiac disorder)  Maternal Grandfather      Diabetes Maternal Grandfather      Arthritis Paternal Grandmother      Other (cadiac disorder) Paternal Grandmother      Diabetes Paternal Grandmother      Arthritis Paternal Grandfather      Other (cardiac disorder) Paternal Grandfather      Diabetes Paternal Grandfather       Allergies   Allergen Reactions    Neosporin (Neomycin-Polymyx) Hives     Current Outpatient Medications   Medication Instructions    atorvastatin (LIPITOR) 40 mg, oral, Nightly    cholecalciferol (VITAMIN D-3) 1,000 Units, oral, Daily    folic acid (Folvite) 400 mcg tablet 1 tablet, oral, Daily    lisinopril 20 mg, oral, Daily    meloxicam (Mobic) 15 mg tablet oral, Daily RT    omeprazole (PRILOSEC) 40 mg, oral, Daily    topiramate (TOPAMAX) 100 mg, oral, 2 times daily       Physical Exam:  General: Alert and oriented, in no acute distress. Appears stated age, well-nourished, and well hydrated  HEENT:  - Head: Normocephalic and atraumatic   - Eyes: EOMI, PERRLA  - ENT: Hearing grossly intact. Mucus membranes pink and moist without lesions. Tonsils present without swelling or exudates. Good dentition. TMs gray  Neck: Supple. No stiffness. No thyromegaly or thyroid nodules  Heart: RRR. No murmurs, clicks, or rubs  Lungs: Unlabored breathing. CTAB with no crackles, wheezes, or rhonchi  Abdomen: Normal BS in all 4 quadrants. Soft, non-tender, non-distended, with no masses  Extremities: Warm and well perfused. No edema. Normal peripheral pulses  Musculoskeletal: ROM intact. Strength 5/5 in BUE and BLE. No joint swelling. Limping on R   Neurological: Alert and oriented. No gross neurological deficits. Normal sensation. No weakness. DTRs +2/4   Psychological: Appropriate mood and affect  Skin: No rash, abnormal lesions, cyanosis, or erythema      Assessment and Plan   1. Hypertension, goal <130/80  - Diastolic borderline  - Continue lisinopril 20 mg every day  - Monitor BP at home  - Lifestyle management     2.  Hyerlipidemia  - Check lipid panel   - Continue atorvastatin 40 mg every day   - Lifestyle management     3. GERD  - Well controlled  - Continue omeprazole 40 mg every day  - Avoidance of known triggers    4. R cubital tunnel syndrome s/p decompression  - Still experiencing pain and weakness, no improvement since surgery in October 2023  - Per patient request, referral placed for orthopedics for 2nd opinion    5. 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 Chronic right hip pain  - Refilled meloxicam  - Patient needs to lose weight in order to have surgery    7. Annual physical  - Tdap booster given, check Hep B antibodies  - Cologuard ordered  - Offered referral to dietician for weight loss but declined at this time  - Labs: lipid panel as above, CMP, PSA, HgA1c  - Maintain healthy lifestyle    RTC in 6 months for chronic care or sooner ROSI Corrales-CNP  [facility]

## 2024-08-17 LAB
25(OH)D3 SERPL-MCNC: 38 NG/ML (ref 30–100)
ALBUMIN SERPL BCP-MCNC: 4.2 G/DL (ref 3.4–5)
ALP SERPL-CCNC: 132 U/L (ref 33–120)
ALT SERPL W P-5'-P-CCNC: 19 U/L (ref 10–52)
ANION GAP SERPL CALC-SCNC: 14 MMOL/L (ref 10–20)
AST SERPL W P-5'-P-CCNC: 16 U/L (ref 9–39)
BILIRUB SERPL-MCNC: 0.7 MG/DL (ref 0–1.2)
BUN SERPL-MCNC: 14 MG/DL (ref 6–23)
CALCIUM SERPL-MCNC: 9.3 MG/DL (ref 8.6–10.6)
CHLORIDE SERPL-SCNC: 104 MMOL/L (ref 98–107)
CHOLEST SERPL-MCNC: 134 MG/DL (ref 0–199)
CHOLESTEROL/HDL RATIO: 2.8
CO2 SERPL-SCNC: 25 MMOL/L (ref 21–32)
CREAT SERPL-MCNC: 0.96 MG/DL (ref 0.5–1.3)
EGFRCR SERPLBLD CKD-EPI 2021: >90 ML/MIN/1.73M*2
GLUCOSE SERPL-MCNC: 108 MG/DL (ref 74–99)
HBV SURFACE AB SER-ACNC: <3.1 MIU/ML
HDLC SERPL-MCNC: 47.1 MG/DL
LDLC SERPL CALC-MCNC: 66 MG/DL
NON HDL CHOLESTEROL: 87 MG/DL (ref 0–149)
POTASSIUM SERPL-SCNC: 4.2 MMOL/L (ref 3.5–5.3)
PROT SERPL-MCNC: 7.2 G/DL (ref 6.4–8.2)
PSA SERPL-MCNC: 0.33 NG/ML
SODIUM SERPL-SCNC: 139 MMOL/L (ref 136–145)
TRIGL SERPL-MCNC: 104 MG/DL (ref 0–149)
TSH SERPL-ACNC: 1.28 MIU/L (ref 0.44–3.98)
VIT B12 SERPL-MCNC: 323 PG/ML (ref 211–911)
VLDL: 21 MG/DL (ref 0–40)

## 2024-08-27 ENCOUNTER — OFFICE VISIT (OUTPATIENT)
Dept: PAIN MEDICINE | Facility: HOSPITAL | Age: 54
End: 2024-08-27
Payer: COMMERCIAL

## 2024-08-27 DIAGNOSIS — M16.11 PRIMARY OSTEOARTHRITIS OF RIGHT HIP: ICD-10-CM

## 2024-08-27 DIAGNOSIS — M54.16 LUMBAR RADICULOPATHY: ICD-10-CM

## 2024-08-27 DIAGNOSIS — M54.16 LUMBAR RADICULITIS: Primary | ICD-10-CM

## 2024-08-27 DIAGNOSIS — M16.9 OSTEOARTHRITIS OF HIP, UNSPECIFIED LATERALITY, UNSPECIFIED OSTEOARTHRITIS TYPE: ICD-10-CM

## 2024-08-27 DIAGNOSIS — G56.20 ULNAR NEUROPATHY, UNSPECIFIED LATERALITY: ICD-10-CM

## 2024-08-27 DIAGNOSIS — G56.21 ULNAR NEUROPATHY OF RIGHT UPPER EXTREMITY: ICD-10-CM

## 2024-08-27 PROCEDURE — 99214 OFFICE O/P EST MOD 30 MIN: CPT | Performed by: ANESTHESIOLOGY

## 2024-08-27 RX ORDER — GABAPENTIN 300 MG/1
CAPSULE ORAL
Qty: 180 CAPSULE | Refills: 0 | Status: SHIPPED | OUTPATIENT
Start: 2024-08-27 | End: 2024-08-30

## 2024-08-27 ASSESSMENT — PAIN SCALES - GENERAL: PAINLEVEL: 7

## 2024-08-27 NOTE — PROGRESS NOTES
Chief Complaint   Patient presents with    Back Pain    Extremity Pain        HPI   Surendra Maher is a 54-year-old male with a history of 2 complaints today 1 of which is an established complaint of back and leg pain and a new complaint of right sided arm pain.  He also has known right sided hip pain and has a severely degenerated hip.    In terms of his low back and leg pain the patient underwent an L4-5 intralaminar epidural last on 4/24/2023 at which time he underwent an L4-5 intralaminar epidural.  The patient says that the aforementioned epidural injection gave him 70 to 80% relief which lasted for 8 months or more.  Pain can be in the back and legs and he is also having some radiating symptoms into the feet bilaterally especially into the heel and foot.  Patient has noted he has been more sedentary recently which can make his back worse.  He continues to do physician directed exercises and stretches for his low back which she learned at formal physical therapy, [facility] protocol which is done 3-4 times a week.    He has known right sided hip osteoarthritis which is severe in nature.  He has seen orthopedic surgery at  but would prefer to see a different specialist.  The patient has worsened pain in the groin with ambulation.  The barrier to surgery at this point since he is failed other conservative measures is his weight.  He is working on lowering his BMI.  He has lost 18 pounds through diet changes, avoiding carbs, and trying to stay active with exercise.  He is looking at seeing a surgeon at the [facility].    In terms of his right arm.  The patient was found to have an ulnar neuropathy and did undergo an in situ ulnar nerve decompression surgery with Dr. Boyer on 10/16/2023.  The patient has followed up with his surgeon who does not have concerns/further recommendation from a surgical standpoint.  The patient has noted swelling and in addition to numbness also pain in the right arm in the ulnar  distribution.  He also has noted some deviation of his little finger that has progressed since surgery.  He has had no other signs or symptoms of CRPS like skin/nails/temperature/color change.  He does have some change in motor function in addition to the intermittent swelling and pain complaints.  He never had any sort of occupational or physical therapy for his arm after surgery.    ROS: 13 point review of systems is complete and is negative listed above in HPI    Past Medical History:   Diagnosis Date    Abdominal distension (gaseous) 02/02/2021    Bloating    Aftercare following joint replacement surgery 02/02/2021    Aftercare following right knee joint replacement surgery    Aortic aneurysm (CMS-HCC)     Dorsopathy, unspecified     Back problem    Encounter for other preprocedural examination 05/15/2020    Preoperative clearance    Encounter for screening for lipoid disorders 02/02/2021    Screening for lipid disorders    Encounter for screening for malignant neoplasm of colon 02/02/2021    Screening for colon cancer    Other cervical disc degeneration, unspecified cervical region     Degenerative disc disease, cervical    Pain in left hip     Hip pain, left    Pain in right hip     Hip pain, right    Personal history of other diseases of the circulatory system     History of hypertension    Personal history of other diseases of the digestive system     History of esophageal reflux    Personal history of other diseases of the musculoskeletal system and connective tissue     History of arthritis    Personal history of other diseases of the nervous system and sense organs     History of otitis media    Personal history of other diseases of the respiratory system     History of sinusitis    Personal history of other diseases of the respiratory system     History of allergic rhinitis    Personal history of other specified conditions 02/02/2021    History of dysuria    Radiculopathy, cervical region     Cervical  radiculitis    Unspecified abnormal findings in urine 2021    Bad odor of urine       Past Surgical History:   Procedure Laterality Date    APPENDECTOMY  2015    Appendectomy    BACK SURGERY  2015    Lower Back Surgery    CHOLECYSTECTOMY      GALLBLADDER SURGERY  2015    Gallbladder Surgery    OTHER SURGICAL HISTORY  2015    Wrist Surgery    OTHER SURGICAL HISTORY  2022    Hip replacement    OTHER SURGICAL HISTORY  2022    Knee replacement       Family History   Problem Relation Name Age of Onset    Other (cardiac disorder) Mother      Diabetes Mother      Arthritis Father      Other (cardiac disorder) Father      Coronary artery disease Father      Diabetes Father      Other (cardiac disorder) Maternal Grandmother      Diabetes Maternal Grandmother      Other (cardiac disorder) Maternal Grandfather      Diabetes Maternal Grandfather      Arthritis Paternal Grandmother      Other (cadiac disorder) Paternal Grandmother      Diabetes Paternal Grandmother      Arthritis Paternal Grandfather      Other (cardiac disorder) Paternal Grandfather      Diabetes Paternal Grandfather         Social History     Tobacco Use    Smoking status: Former     Current packs/day: 0.00     Average packs/day: 1.5 packs/day for 20.0 years (30.0 ttl pk-yrs)     Types: Cigarettes     Start date:      Quit date:      Years since quittin.6    Smokeless tobacco: Never   Vaping Use    Vaping status: Never Used   Substance Use Topics    Alcohol use: Not Currently     Alcohol/week: 30.0 standard drinks of alcohol     Types: 30 Cans of beer per week     Comment: 30 PER WEEKEND    Drug use: Never       Current Outpatient Medications on File Prior to Visit   Medication Sig Dispense Refill    atorvastatin (Lipitor) 40 mg tablet Take 1 tablet (40 mg) by mouth once daily at bedtime. 90 tablet 1    cholecalciferol (Vitamin D-3) 25 MCG (1000 UT) tablet Take 1 tablet (1,000 Units) by mouth once daily.  90 tablet 1    folic acid (Folvite) 400 mcg tablet Take 1 tablet (0.4 mg) by mouth once daily.      lisinopril 20 mg tablet Take 1 tablet (20 mg) by mouth once daily. 90 tablet 1    meloxicam (Mobic) 15 mg tablet Take 1 tablet (15 mg) by mouth once daily as needed for moderate pain (4 - 6). 30 tablet 0    omeprazole (PriLOSEC) 40 mg DR capsule Take 1 capsule (40 mg) by mouth once daily. 90 capsule 1    topiramate (Topamax) 100 mg tablet Take 1 tablet (100 mg) by mouth 2 times a day. 180 tablet 3     No current facility-administered medications on file prior to visit.        Allergies   Allergen Reactions    Neosporin (Neomycin-Polymyx) Hives      Imaging:  Interpreted By:  Kinga Loera,   STUDY:  Single view pelvis.  Right hip, two views.      INDICATION:  Signs/Symptoms:Right hip pain.      COMPARISON:  12/08/2017.      ACCESSION NUMBER(S):  [id number]      ORDERING CLINICIAN:  MIKE ACEVEDO      FINDINGS:  No acute fracture or malalignment.  Severe right hip osteoarthrosis with joint space loss and osteophytes.  Left hip arthroplasty without hardware complication.  Advanced appearing lower lumbar spine degenerative changes.  Soft tissues are within normal limits.      IMPRESSION:  1. Severe right hip osteoarthrosis. Additional findings as above.      Physical Exam:  Gen.: No acute distress  Eyes: Pupils symmetric  ENT: Hearing intact/normal  Respiratory: No gasping or shortness of breath  Cardiovascular: Extremity show no edema or varicosities  Skin: No rashes or open lesions or ulcers identified on the skin  Musculoskeletal: Positive SLR bilaterally.  Intact strength in the lower extremities.  Normal reflexes.  Negative Spurling's and Ander's in the upper extremities.  In the right upper extremity there is some weakness in the ulnar distribution and decreased sensation in the ulnar distribution.  There is no swelling/hair-nails-skin changes.  Temperature is normal and equal bilaterally in the upper  extremities.  There is some deep tissue hyperalgesia in the ulnar distribution in the hand and arm.  Neurologic: Cranial nerves II through XII are grossly intact  Psychiatric:  Patient is alert and oriented x3    Impression/Plan:  54-year-old male with a history of multiple pain complaints.  He has known severe hip osteoarthritis for which she is losing weight and wants to see another surgeon in regards to a replacement.  The patient has low back pain and known significant degenerative changes in the low back, in addition to keeping up with conservative measures he had an epidural which gave him 70 to 80% relief for 8 months.  Patient would like to pursue a repeat injection for his low back.  He also had a new complaint of ulnar neuropathy which did not improve and actually is worse following a decompression surgery.    -For his ulnar neuropathy will send for occupational therapy for the right upper extremity.  Will initiate a gabapentin titration.  Side effect profile and risk reviewed.  Written instructions given to the patient explained.  If we stop this later would need to wean it down slowly rather than stop it abruptly.  He does not quite fit the criteria for CRPS.  We did discuss the potential for neuromodulation down the line but would certainly do lesser invasive measures first.    -For his low back we will plan for a repeat lumbar epidural at L4-5.  Procedure, risk, benefits, alternatives reviewed.  If we do not see prolonged relief as we have in the past, will get updated advanced imaging of the spine in the form of an MRI.    -Encouraged to work on core strengthening, continue home physician directed exercises.    -For his hip I did give the patient the name of joint replacement surgeons, Dr. Ortiz Sutton and Dr. Salazar.

## 2024-09-04 LAB — NONINV COLON CA DNA+OCC BLD SCRN STL QL: NEGATIVE

## 2024-09-16 ENCOUNTER — APPOINTMENT (OUTPATIENT)
Facility: HOSPITAL | Age: 54
End: 2024-09-16
Payer: COMMERCIAL

## 2024-09-22 NOTE — H&P
Pain Management H&P    History Of Present Illness  Surendra Maher is a 54 y.o. male presents for procedure state below. Endorses no changes in past medical history or medical health since last seen in clinic.      Past Medical History  He has a past medical history of Abdominal distension (gaseous) (02/02/2021), Aftercare following joint replacement surgery (02/02/2021), Aortic aneurysm (CMS-HCC), Dorsopathy, unspecified, Encounter for other preprocedural examination (05/15/2020), Encounter for screening for lipoid disorders (02/02/2021), Encounter for screening for malignant neoplasm of colon (02/02/2021), Other cervical disc degeneration, unspecified cervical region, Pain in left hip, Pain in right hip, Personal history of other diseases of the circulatory system, Personal history of other diseases of the digestive system, Personal history of other diseases of the musculoskeletal system and connective tissue, Personal history of other diseases of the nervous system and sense organs, Personal history of other diseases of the respiratory system, Personal history of other diseases of the respiratory system, Personal history of other specified conditions (02/02/2021), Radiculopathy, cervical region, and Unspecified abnormal findings in urine (02/02/2021).    Surgical History  He has a past surgical history that includes Back surgery (05/19/2015); Appendectomy (05/19/2015); Other surgical history (05/19/2015); Gallbladder surgery (05/19/2015); Other surgical history (11/09/2022); Other surgical history (11/09/2022); and Cholecystectomy.     Social History  He reports that he quit smoking about 14 years ago. His smoking use included cigarettes. He started smoking about 34 years ago. He has a 30 pack-year smoking history. He has never used smokeless tobacco. He reports that he does not currently use alcohol after a past usage of about 30.0 standard drinks of alcohol per week. He reports that he does not use  drugs.    Family History  Family History   Problem Relation Name Age of Onset    Other (cardiac disorder) Mother      Diabetes Mother      Arthritis Father      Other (cardiac disorder) Father      Coronary artery disease Father      Diabetes Father      Other (cardiac disorder) Maternal Grandmother      Diabetes Maternal Grandmother      Other (cardiac disorder) Maternal Grandfather      Diabetes Maternal Grandfather      Arthritis Paternal Grandmother      Other (cadiac disorder) Paternal Grandmother      Diabetes Paternal Grandmother      Arthritis Paternal Grandfather      Other (cardiac disorder) Paternal Grandfather      Diabetes Paternal Grandfather          Allergies  Neosporin (neomycin-polymyx)    Review of Symptoms:   Constitutional: Negative for chills, diaphoresis or fever  HENT: Negative for neck swelling  Eyes:.  Negative for eye pain  Respiratory:.  Negative for cough, shortness of breath or wheezing    Cardiovascular:.  Negative for chest pain or palpitations  Gastrointestinal:.  Negative for abdominal pain, nausea and vomiting  Genitourinary:.  Negative for urgency  Musculoskeletal:  Positive for back pain. Positive for joint pain. Denies falls within the past 3 months.  Skin: Negative for wounds or itching   Neurological: Negative for dizziness, seizures, loss of consciousness and weakness  Endo/Heme/Allergies: Does not bruise/bleed easily  Psychiatric/Behavioral: Negative for depression. The patient does not appear anxious.       PHYSICAL EXAM  Vitals signs reviewed  Constitutional:       General: Not in acute distress     Appearance: Normal appearance. Not ill-appearing.  HENT:     Head: Normocephalic and atraumatic  Eyes:     Conjunctiva/sclera: Conjunctivae normal  Cardiovascular:     Rate and Rhythm: Normal rate and regular rhythm  Pulmonary:     Effort: No respiratory distress  Abdominal:     Palpations: Abdomen is soft  Musculoskeletal: CASE  Skin:     General: Skin is warm and  dry  Neurological:     General: No focal deficit present  Psychiatric:         Mood and Affect: Mood normal         Behavior: Behavior normal     Last Recorded Vitals  There were no vitals taken for this visit.    Relevant Results  Current Outpatient Medications   Medication Instructions    atorvastatin (LIPITOR) 40 mg, oral, Nightly    cholecalciferol (VITAMIN D-3) 1,000 Units, oral, Daily    folic acid (Folvite) 400 mcg tablet 1 tablet, oral, Daily    gabapentin (Neurontin) 300 mg capsule Take 1 capsule (300 mg) by mouth once daily at bedtime for 1 day, THEN 1 capsule (300 mg) 2 times a day for 1 day, THEN 1 capsule (300 mg) 3 times a day for 1 day. Thereafter, increase by one capsule every third day until taking two capsules three times a day.    lisinopril 20 mg, oral, Daily    meloxicam (MOBIC) 15 mg, oral, Daily PRN    omeprazole (PRILOSEC) 40 mg, oral, Daily    topiramate (TOPAMAX) 100 mg, oral, 2 times daily         XR hip right with pelvis when performed 2 or 3 views 02/02/2024    Narrative  Interpreted By:  Kinga Loera,  STUDY:  Single view pelvis.  Right hip, two views.    INDICATION:  Signs/Symptoms:Right hip pain.    COMPARISON:  12/08/2017.    ACCESSION NUMBER(S):  [id number]    ORDERING CLINICIAN:  MIKE ACEVEDO    FINDINGS:  No acute fracture or malalignment.  Severe right hip osteoarthrosis with joint space loss and osteophytes.  Left hip arthroplasty without hardware complication.  Advanced appearing lower lumbar spine degenerative changes.  Soft tissues are within normal limits.    Impression  1. Severe right hip osteoarthrosis. Additional findings as above.    MACRO:  None.    Signed by: Kinga Loera 2/3/2024 9:18 AM  Dictation workstation:   [id number]     No image results found.       No diagnosis found.     ASSESSMENT/PLAN  Surendra Maher is a 54 y.o. male here for L4-5 LESI    Patient denies any recent antibiotic use or infections, denies any blood thinner use, and denies contrast  or local anesthetic allergies     Risks, benefits, alternatives discussed. All questions answered to the best of my ability. Patient agrees to proceed.      Our plan is as follows:  - Proceed with aforementioned procedure          DO HAO Martinez Pain fellow

## 2024-09-23 ENCOUNTER — HOSPITAL ENCOUNTER (OUTPATIENT)
Facility: HOSPITAL | Age: 54
Discharge: HOME | End: 2024-09-23
Payer: COMMERCIAL

## 2024-09-23 VITALS
RESPIRATION RATE: 16 BRPM | OXYGEN SATURATION: 97 % | TEMPERATURE: 97.3 F | HEART RATE: 78 BPM | DIASTOLIC BLOOD PRESSURE: 88 MMHG | SYSTOLIC BLOOD PRESSURE: 135 MMHG

## 2024-09-23 DIAGNOSIS — M54.16 LUMBAR RADICULOPATHY: ICD-10-CM

## 2024-09-23 PROCEDURE — 2500000004 HC RX 250 GENERAL PHARMACY W/ HCPCS (ALT 636 FOR OP/ED): Performed by: ANESTHESIOLOGY

## 2024-09-23 PROCEDURE — 2550000001 HC RX 255 CONTRASTS: Performed by: ANESTHESIOLOGY

## 2024-09-23 PROCEDURE — 2500000005 HC RX 250 GENERAL PHARMACY W/O HCPCS: Performed by: ANESTHESIOLOGY

## 2024-09-23 PROCEDURE — 62323 NJX INTERLAMINAR LMBR/SAC: CPT | Performed by: ANESTHESIOLOGY

## 2024-09-23 RX ORDER — SODIUM CHLORIDE 9 MG/ML
INJECTION, SOLUTION INTRAMUSCULAR; INTRAVENOUS; SUBCUTANEOUS
Status: COMPLETED | OUTPATIENT
Start: 2024-09-23 | End: 2024-09-23

## 2024-09-23 RX ORDER — LIDOCAINE HYDROCHLORIDE 5 MG/ML
INJECTION, SOLUTION INFILTRATION; INTRAVENOUS
Status: COMPLETED | OUTPATIENT
Start: 2024-09-23 | End: 2024-09-23

## 2024-09-23 RX ORDER — METHYLPREDNISOLONE ACETATE 40 MG/ML
INJECTION, SUSPENSION INTRA-ARTICULAR; INTRALESIONAL; INTRAMUSCULAR; SOFT TISSUE
Status: COMPLETED | OUTPATIENT
Start: 2024-09-23 | End: 2024-09-23

## 2024-09-23 RX ORDER — SODIUM CHLORIDE 9 MG/ML
INJECTION, SOLUTION INTRAMUSCULAR; INTRAVENOUS; SUBCUTANEOUS
Status: DISPENSED
Start: 2024-09-23 | End: 2024-09-23

## 2024-09-23 RX ORDER — LIDOCAINE HYDROCHLORIDE 5 MG/ML
INJECTION, SOLUTION INFILTRATION; INTRAVENOUS
Status: DISPENSED
Start: 2024-09-23 | End: 2024-09-23

## 2024-09-23 RX ORDER — METHYLPREDNISOLONE ACETATE 40 MG/ML
INJECTION, SUSPENSION INTRA-ARTICULAR; INTRALESIONAL; INTRAMUSCULAR; SOFT TISSUE
Status: DISPENSED
Start: 2024-09-23 | End: 2024-09-23

## 2024-09-23 ASSESSMENT — COLUMBIA-SUICIDE SEVERITY RATING SCALE - C-SSRS
6. HAVE YOU EVER DONE ANYTHING, STARTED TO DO ANYTHING, OR PREPARED TO DO ANYTHING TO END YOUR LIFE?: NO
2. HAVE YOU ACTUALLY HAD ANY THOUGHTS OF KILLING YOURSELF?: NO
1. IN THE PAST MONTH, HAVE YOU WISHED YOU WERE DEAD OR WISHED YOU COULD GO TO SLEEP AND NOT WAKE UP?: NO

## 2024-09-23 ASSESSMENT — PAIN SCALES - GENERAL
PAINLEVEL_OUTOF10: 10 - WORST POSSIBLE PAIN
PAINLEVEL_OUTOF10: 6
PAINLEVEL_OUTOF10: 10 - WORST POSSIBLE PAIN
PAINLEVEL_OUTOF10: 10 - WORST POSSIBLE PAIN

## 2024-09-23 ASSESSMENT — PAIN - FUNCTIONAL ASSESSMENT
PAIN_FUNCTIONAL_ASSESSMENT: 0-10
PAIN_FUNCTIONAL_ASSESSMENT: WONG-BAKER FACES
PAIN_FUNCTIONAL_ASSESSMENT: WONG-BAKER FACES
PAIN_FUNCTIONAL_ASSESSMENT: 0-10

## 2024-09-23 NOTE — DISCHARGE INSTRUCTIONS
DISCHARGE INSTRUCTIONS FOR INJECTIONS     You underwent lumbar interlaminar epidural steroid injection at the L4-5 level today    After most injections, it is recommended that you relax and limit your activity for the remainder of the day unless you have been told otherwise by your pain physician.  You should not drive a car, operate machinery, or make important legal decisions unless otherwise directed by your pain physician.  You may resume your normal activity, including exercise, tomorrow.      Keep a written pain diary of how much pain relief you experienced following the injection procedure and the length of time of pain relief you experienced pain relief. Following diagnostic injections like medial branch nerve blocks, sacroiliac joint blocks, stellate ganglion injections and other blocks, it is very important you record the specific amount of pain relief you experienced immediately after the injectionand how long it lasted. Your doctor will ask you for this information at your follow up visit.     For all injections, please keep the injection site dry and inspect the site for a couple of days. You may remove the Band-Aid the day of the injection at any time.     Some discomfort, bruising or slight swelling may occur at the injection site. This is not abnormal if it occurs.  If needed you may:    -Take over the counter medication such as Tylenol or Motrin.   -Apply an ice pack for 30 minutes, 2 to 3 times a day for the first 24 hours.     You may shower today; no soaking baths, hot tubs, whirlpools or swimming pools for two days.      If you are given steroids in your injection, it may take 3-5 days for the steroid medication to take effect. You may notice a worsening of your symptoms for 1-2 days after the injection. This is not abnormal.  You may use acetaminophen, ibuprofen, or prescription medication that your doctor may have prescribed for you if you need to do so.     A few common side effects of  steroids include facial flushing, sweating, restlessness, irritability,difficulty sleeping, increase in blood sugar, and increased blood pressure. If you have diabetes, please monitor your blood sugar at least once a day for at least 5 days. If you have poorly controlled high blood pressure, monitoryour blood pressure for at least 2 days and contact your primary care physician if these numbers are unusually high for you.      If you take aspirin or non-steroidal anti-inflammatory drugs (examples are Motrin, Advil, ibuprofen, Naprosyn, Voltaren, Relafen, etc.) you may restart these this evening, but stop taking it 3 days before your next appointment, unless instructed otherwiseby your physician.      You do not need to discontinue non-aspirin-containing pain medications prior to an injection (examples: Celebrex, tramadol, hydrocodone and acetaminophen).      If you take a blood thinning medication (Coumadin, Lovenox, Fragmin,Ticlid, Plavix, Pradaxa, etc.), please discuss this with your primary care physician/cardiologist and your pain physician. These medications MUST be discontinued before you can have an injection safely, without the risk of uncontrolled bleeding. If these medications are not discontinued for an appropriate period of time, you will not be able to receivean injection. Please adhere to instructions given to you about when to restart your blood thinning medication. If you have any questions please reach out to our team.    If you are taking Coumadin, please have your INR checked the morning of your procedure and bring the result to your appointment unless otherwise instructed. If your INR is over 1.2, your injection may need to be rescheduled to avoid uncontrolled bleeding from the needle placement.     Call [facility]  and ask for Pain Management at [phone] between 8am-4pm Monday - Friday if you are experiencing the following:    If you received an epidural or spinal injection:    -Headache that  doesnot go away with medicine, is worse when sitting or standing up, and is greatly relieved upon lying down.   -Severe pain worse than or different than your baseline pain.   -Chills or fever (101º F or greater).   -Drainage or signs of infection at the injection site     Go directly to the Emergency Department if you are experiencing the following and received an epidural or spinal injection:   -Abrupt weakness or progressive weakness in your legs that starts after you leave the clinic.   -Abrupt severe or worsening numbness in your legs.   -Inability to urinate after the injection or loss of bowel or bladder control without the urge to defecate or urinate.     If you have a clinical question that cannot wait until your next appointment, please call [phone] between 8am-4pm Monday - Friday or send a [facility] message. We do our best to return all non-emergency messages within 24 hours, Monday - Friday. A nurse or physician will return your message. You may also the appropriate nurse below, and they will do their best to answer your questions.  - For Dr. Bourgeois, call [facility] at [phone]  - For Dr. Velasquez, call [facility] at [phone]  - For Dr. Meehan, call [facility] at [phone]  - For Dr. Cordon, call [facility] at [phone]  - For Dr. Vaz, call [facility] at [phone]    If you need to cancel an appointment, please call the scheduling staff at [phone] during normal business hours or leave a message at least 24 hours in advance.     If you are going to be sedated for your next procedure, you MUST have responsible adult who can legally drive accompany you home. You cannot eat or drink for at least eight hours prior to the planned procedure if you are going to receive sedation. You may take your non-blood thinning medications with a small sip of water.

## 2024-09-30 DIAGNOSIS — M54.16 LUMBAR RADICULOPATHY: ICD-10-CM

## 2024-09-30 RX ORDER — GABAPENTIN 600 MG/1
600 TABLET ORAL 3 TIMES DAILY
Qty: 90 TABLET | Refills: 5 | Status: SHIPPED | OUTPATIENT
Start: 2024-09-30 | End: 2025-09-30

## 2024-10-02 ENCOUNTER — APPOINTMENT (OUTPATIENT)
Dept: PODIATRY | Facility: CLINIC | Age: 54
End: 2024-10-02
Payer: COMMERCIAL

## 2024-11-06 ENCOUNTER — APPOINTMENT (OUTPATIENT)
Dept: RADIOLOGY | Facility: HOSPITAL | Age: 54
End: 2024-11-06
Payer: COMMERCIAL

## 2024-11-12 ENCOUNTER — APPOINTMENT (OUTPATIENT)
Dept: CARDIAC SURGERY | Facility: CLINIC | Age: 54
End: 2024-11-12
Payer: COMMERCIAL

## 2024-11-17 DIAGNOSIS — E78.00 ELEVATED LDL CHOLESTEROL LEVEL: ICD-10-CM

## 2024-11-18 ENCOUNTER — APPOINTMENT (OUTPATIENT)
Dept: CARDIAC SURGERY | Facility: CLINIC | Age: 54
End: 2024-11-18
Payer: COMMERCIAL

## 2024-11-18 RX ORDER — ATORVASTATIN CALCIUM 40 MG/1
40 TABLET, FILM COATED ORAL NIGHTLY
Qty: 90 TABLET | Refills: 1 | Status: SHIPPED | OUTPATIENT
Start: 2024-11-18

## 2025-02-19 DIAGNOSIS — I10 BENIGN ESSENTIAL HTN: ICD-10-CM

## 2025-02-19 RX ORDER — LISINOPRIL 20 MG/1
20 TABLET ORAL DAILY
Qty: 90 TABLET | Refills: 0 | Status: SHIPPED | OUTPATIENT
Start: 2025-02-19

## 2025-03-18 ENCOUNTER — PATIENT MESSAGE (OUTPATIENT)
Dept: PRIMARY CARE | Facility: CLINIC | Age: 55
End: 2025-03-18
Payer: COMMERCIAL

## 2025-03-18 DIAGNOSIS — I10 BENIGN ESSENTIAL HTN: ICD-10-CM

## 2025-03-18 DIAGNOSIS — K21.9 GASTROESOPHAGEAL REFLUX DISEASE WITHOUT ESOPHAGITIS: ICD-10-CM

## 2025-03-18 RX ORDER — LISINOPRIL 20 MG/1
20 TABLET ORAL DAILY
Qty: 30 TABLET | Refills: 0 | Status: SHIPPED | OUTPATIENT
Start: 2025-03-18 | End: 2025-04-17

## 2025-03-18 RX ORDER — OMEPRAZOLE 40 MG/1
40 CAPSULE, DELAYED RELEASE ORAL DAILY
Qty: 30 CAPSULE | Refills: 0 | Status: SHIPPED | OUTPATIENT
Start: 2025-03-18 | End: 2025-04-17

## 2025-04-04 ENCOUNTER — APPOINTMENT (OUTPATIENT)
Dept: PRIMARY CARE | Facility: CLINIC | Age: 55
End: 2025-04-04
Payer: COMMERCIAL

## 2025-04-04 VITALS
OXYGEN SATURATION: 96 % | SYSTOLIC BLOOD PRESSURE: 107 MMHG | WEIGHT: 306 LBS | DIASTOLIC BLOOD PRESSURE: 74 MMHG | TEMPERATURE: 98 F | HEART RATE: 69 BPM | HEIGHT: 70 IN | BODY MASS INDEX: 43.81 KG/M2

## 2025-04-04 DIAGNOSIS — R79.89 LOW VITAMIN D LEVEL: ICD-10-CM

## 2025-04-04 DIAGNOSIS — G89.29 CHRONIC HIP PAIN, UNSPECIFIED LATERALITY: ICD-10-CM

## 2025-04-04 DIAGNOSIS — I10 BENIGN ESSENTIAL HTN: ICD-10-CM

## 2025-04-04 DIAGNOSIS — E66.01 CLASS 3 SEVERE OBESITY DUE TO EXCESS CALORIES WITH SERIOUS COMORBIDITY AND BODY MASS INDEX (BMI) OF 40.0 TO 44.9 IN ADULT: Primary | ICD-10-CM

## 2025-04-04 DIAGNOSIS — E66.813 CLASS 3 SEVERE OBESITY DUE TO EXCESS CALORIES WITH SERIOUS COMORBIDITY AND BODY MASS INDEX (BMI) OF 40.0 TO 44.9 IN ADULT: Primary | ICD-10-CM

## 2025-04-04 DIAGNOSIS — M25.559 CHRONIC HIP PAIN, UNSPECIFIED LATERALITY: ICD-10-CM

## 2025-04-04 DIAGNOSIS — K21.9 GASTROESOPHAGEAL REFLUX DISEASE WITHOUT ESOPHAGITIS: ICD-10-CM

## 2025-04-04 PROCEDURE — 1036F TOBACCO NON-USER: CPT | Performed by: NURSE PRACTITIONER

## 2025-04-04 PROCEDURE — 99214 OFFICE O/P EST MOD 30 MIN: CPT | Performed by: NURSE PRACTITIONER

## 2025-04-04 PROCEDURE — 3074F SYST BP LT 130 MM HG: CPT | Performed by: NURSE PRACTITIONER

## 2025-04-04 PROCEDURE — 3008F BODY MASS INDEX DOCD: CPT | Performed by: NURSE PRACTITIONER

## 2025-04-04 PROCEDURE — 3078F DIAST BP <80 MM HG: CPT | Performed by: NURSE PRACTITIONER

## 2025-04-04 RX ORDER — OMEPRAZOLE 40 MG/1
40 CAPSULE, DELAYED RELEASE ORAL DAILY
Qty: 90 CAPSULE | Refills: 1 | Status: SHIPPED | OUTPATIENT
Start: 2025-04-04 | End: 2025-10-01

## 2025-04-04 RX ORDER — CHOLECALCIFEROL (VITAMIN D3) 25 MCG
1000 TABLET ORAL DAILY
Qty: 90 TABLET | Refills: 3 | Status: SHIPPED | OUTPATIENT
Start: 2025-04-04

## 2025-04-04 RX ORDER — LISINOPRIL 20 MG/1
20 TABLET ORAL DAILY
Qty: 90 TABLET | Refills: 1 | Status: SHIPPED | OUTPATIENT
Start: 2025-04-04 | End: 2025-10-01

## 2025-04-04 RX ORDER — MELOXICAM 15 MG/1
15 TABLET ORAL DAILY PRN
Qty: 30 TABLET | Refills: 0 | Status: SHIPPED | OUTPATIENT
Start: 2025-04-04

## 2025-04-04 ASSESSMENT — ENCOUNTER SYMPTOMS
DEPRESSION: 0
LOSS OF SENSATION IN FEET: 0
OCCASIONAL FEELINGS OF UNSTEADINESS: 0

## 2025-04-04 NOTE — PROGRESS NOTES
"Subjective   Patient ID: Surendra Maher is a 55 y.o. male who presents for med refill    HPI     Hypertension  Hyperlipidemia  Current meds: atorvastatin 40 mg every day, lisinopril 20 mg every day   Home blood pressure monitoring: occasionally at the drug store, controlled   Salt intake: limits   Caffeine intake: 1 cup of tea per day  Diet: not great although has been losing weight   Exercise: none due to his hip and back pain   Alcohol use: none  Tobacco use: none  Illicit drug use: none     Denies vision changes, headaches, dizziness, chest pain, palpitations, or edema.     GERD  Well controlled when he takes omeprazole  Usually gets some heartburn with red sauce   Denies nausea, vomiting, diarrhea, abdominal pain, or blood in his stool    R hip pain  Still having a lot of pain to his R hip. Has been told he needs to lose weight for surgery. He has been attempting to lose weight with limiting his portions and intermittent fasting. Struggles to exercise due to the pain. He does admit he has been taking a lot of motrin, wondering if he can have a refill of meloxicam. He is planning to see ortho at [facility] later this month.    Review of Systems  ROS negative except as noted above in HPI.     Objective   /74   Pulse 69   Temp 36.7 °C (98 °F) (Oral)   Ht 1.778 m (5' 10\")   Wt 139 kg (306 lb)   SpO2 96%   BMI 43.91 kg/m²     Lab Results   Component Value Date    CHOL 134 08/16/2024    CHOL 134 09/01/2023    CHOL 203 (H) 10/24/2022     Lab Results   Component Value Date    HDL 47.1 08/16/2024    HDL 42.6 09/01/2023    HDL 43.6 10/24/2022     Lab Results   Component Value Date    LDLCALC 66 08/16/2024     Lab Results   Component Value Date    TRIG 104 08/16/2024    TRIG 87 09/01/2023    TRIG 185 (H) 10/24/2022     No components found for: \"CHOLHDL\"     Lab Results   Component Value Date    HGBA1C 5.3 08/16/2024      Lab Results   Component Value Date    TSH 1.28 08/16/2024      Lab Results   Component " Value Date    WBC 6.4 08/16/2024    HGB 14.2 08/16/2024    HCT 44.3 08/16/2024    MCV 98 08/16/2024     08/16/2024        Chemistry    Lab Results   Component Value Date/Time     08/16/2024 0830    K 4.2 08/16/2024 0830     08/16/2024 0830    CO2 25 08/16/2024 0830    BUN 14 08/16/2024 0830    CREATININE 0.96 08/16/2024 0830    Lab Results   Component Value Date/Time    CALCIUM 9.3 08/16/2024 0830    ALKPHOS 132 (H) 08/16/2024 0830    AST 16 08/16/2024 0830    ALT 19 08/16/2024 0830    BILITOT 0.7 08/16/2024 0830           Physical Exam  General: Alert and oriented, in no acute distress. Appears stated age, well-nourished, and well hydrated  HEENT:  - Head: Normocephalic and atraumatic   - Eyes: EOMI, PERRLA  - ENT: Hearing grossly intact  Heart: RRR. No murmurs, clicks, or rubs  Lungs: Unlabored breathing. CTAB with no crackles, wheezes, or rhonchi  Abdomen: Normal BS in all 4 quadrants. Soft, non-tender, non-distended, with no masses  Extremities: Warm and well perfused. No edema. Normal peripheral pulses  Musculoskeletal: Normal gait and station  Neurological: Alert and oriented. No gross neurological deficits  Psychological: Appropriate mood and affect  Skin: No rash, abnormal lesions, cyanosis, or erythema    Assessment/Plan   Diagnoses and all orders for this visit:  Low vitamin D level  - Continue cholecalciferol (Vitamin D-3) 25 mcg (1000 units) tablet; Take 1 tablet (1,000 Units) by mouth once daily.  Gastroesophageal reflux disease without esophagitis  - Continue omeprazole (PriLOSEC) 40 mg DR capsule; Take 1 capsule (40 mg) by mouth once daily.  Benign essential HTN  - Well controlled  -  Continue lisinopril 20 mg tablet; Take 1 tablet (20 mg) by mouth once daily.  Chronic hip pain, unspecified laterality  - meloxicam (Mobic) 15 mg tablet; Take 1 tablet (15 mg) by mouth once daily as needed for moderate pain (4 - 6).  - Check Renal function panel; Future due to NSAID use  Class 3 severe  obesity due to excess calories with serious comorbidity and body mass index (BMI) of 40.0 to 44.9 in adult  - Offered to start GLP-1 (looks like Zepbound and Wegovy are on his formulary), but prefers to continue to try to lose weight on his own    FU in 6 months for physical and med refill or sooner jose luis Alcantara, APRN-CNP  [facility]

## 2025-04-05 LAB
ALBUMIN SERPL-MCNC: 4.2 G/DL (ref 3.6–5.1)
BUN SERPL-MCNC: 18 MG/DL (ref 7–25)
BUN/CREAT SERPL: NORMAL (CALC) (ref 6–22)
CALCIUM SERPL-MCNC: 9.5 MG/DL (ref 8.6–10.3)
CHLORIDE SERPL-SCNC: 103 MMOL/L (ref 98–110)
CO2 SERPL-SCNC: 24 MMOL/L (ref 20–32)
CREAT SERPL-MCNC: 0.81 MG/DL (ref 0.7–1.3)
EGFRCR SERPLBLD CKD-EPI 2021: 105 ML/MIN/1.73M2
GLUCOSE SERPL-MCNC: 97 MG/DL (ref 65–99)
PHOSPHATE SERPL-MCNC: 3.8 MG/DL (ref 2.5–4.5)
POTASSIUM SERPL-SCNC: 4.1 MMOL/L (ref 3.5–5.3)
SODIUM SERPL-SCNC: 138 MMOL/L (ref 135–146)

## 2025-04-11 ENCOUNTER — APPOINTMENT (OUTPATIENT)
Dept: PRIMARY CARE | Facility: CLINIC | Age: 55
End: 2025-04-11
Payer: COMMERCIAL

## 2025-04-17 DIAGNOSIS — M54.16 LUMBAR RADICULOPATHY: ICD-10-CM

## 2025-04-17 RX ORDER — GABAPENTIN 600 MG/1
600 TABLET ORAL 3 TIMES DAILY
Qty: 90 TABLET | Refills: 5 | Status: SHIPPED | OUTPATIENT
Start: 2025-04-17 | End: 2026-04-17

## 2025-04-21 DIAGNOSIS — K21.9 GASTROESOPHAGEAL REFLUX DISEASE WITHOUT ESOPHAGITIS: ICD-10-CM

## 2025-04-21 RX ORDER — OMEPRAZOLE 40 MG/1
40 CAPSULE, DELAYED RELEASE ORAL DAILY
Qty: 90 CAPSULE | Refills: 1 | Status: SHIPPED | OUTPATIENT
Start: 2025-04-21 | End: 2025-10-18

## 2025-05-14 ENCOUNTER — APPOINTMENT (OUTPATIENT)
Dept: PRIMARY CARE | Facility: CLINIC | Age: 55
End: 2025-05-14
Payer: COMMERCIAL

## 2025-05-14 VITALS
HEART RATE: 88 BPM | RESPIRATION RATE: 17 BRPM | BODY MASS INDEX: 43.65 KG/M2 | WEIGHT: 304.2 LBS | OXYGEN SATURATION: 97 % | DIASTOLIC BLOOD PRESSURE: 85 MMHG | SYSTOLIC BLOOD PRESSURE: 121 MMHG

## 2025-05-14 DIAGNOSIS — E78.00 ELEVATED LDL CHOLESTEROL LEVEL: ICD-10-CM

## 2025-05-14 PROCEDURE — 3079F DIAST BP 80-89 MM HG: CPT | Performed by: NURSE PRACTITIONER

## 2025-05-14 PROCEDURE — 3074F SYST BP LT 130 MM HG: CPT | Performed by: NURSE PRACTITIONER

## 2025-05-14 PROCEDURE — 1036F TOBACCO NON-USER: CPT | Performed by: NURSE PRACTITIONER

## 2025-05-14 PROCEDURE — 99213 OFFICE O/P EST LOW 20 MIN: CPT | Performed by: NURSE PRACTITIONER

## 2025-05-14 RX ORDER — ATORVASTATIN CALCIUM 40 MG/1
40 TABLET, FILM COATED ORAL NIGHTLY
Qty: 90 TABLET | Refills: 3 | Status: SHIPPED | OUTPATIENT
Start: 2025-05-14

## 2025-05-14 ASSESSMENT — COLUMBIA-SUICIDE SEVERITY RATING SCALE - C-SSRS
1. IN THE PAST MONTH, HAVE YOU WISHED YOU WERE DEAD OR WISHED YOU COULD GO TO SLEEP AND NOT WAKE UP?: NO
2. HAVE YOU ACTUALLY HAD ANY THOUGHTS OF KILLING YOURSELF?: NO
6. HAVE YOU EVER DONE ANYTHING, STARTED TO DO ANYTHING, OR PREPARED TO DO ANYTHING TO END YOUR LIFE?: NO

## 2025-05-14 NOTE — PROGRESS NOTES
Subjective   Surendra Maher is a 55 y.o. male who presents to the office today for a preoperative consultation at the request of surgeon Celso Howe MD who plans on performing R total hip arthroplasty on [address]. This consultation is requested for the specific conditions prompting preoperative evaluation (i.e. because of potential affect on operative risk): hypertension, thoracic aortic aneurysm. . Planned anesthesia is spinal/epidural. The patient has the following known anesthesia issues: none. Patient has a bleeding risk of: no recent abnormal bleeding, no remote history of abnormal bleeding, and no use of Ca-channel blockers. Patient does not have objections to receiving blood products if needed.    Denies fevers, chills body aches, headaches, dizziness, lightheadedness, chest pain, palpitations, shortness of breath, nausea, vomiting, or diarrhea.     Review of Systems  ROS  ROS negative except as noted above in HPI.        Objective   Physical Exam  Physical Exam    General: Alert and oriented, in no acute distress. Appears stated age, well-nourished, and well hydrated  HEENT:  - Head: Normocephalic and atraumatic   - Eyes: EOMI, PERRLA  - ENT: Hearing grossly intact  Heart: RRR. No murmurs, clicks, or rubs  Lungs: Unlabored breathing. CTAB with no crackles, wheezes, or rhonchi  Abdomen: Normal BS in all 4 quadrants. Soft, non-tender, non-distended, with no masses  Extremities: Warm and well perfused. No edema. Normal peripheral pulses  Musculoskeletal: Normal gait and station  Neurological: Alert and oriented. No gross neurological deficits  Psychological: Appropriate mood and affect  Skin: No rash, abnormal lesions, cyanosis, or erythema    Cardiographics  ECG: Will review ECG from pre-op clearance from [facility] on 5/13/25.     Imaging  Chest x-ray: not done    Lab Review   Will review labs from pre-op clearance from [facility] on 5/13/25.      Assessment/Plan   55 y.o. male with planned surgery as  above.    Known risk factors for perioperative complications: none    Cardiac Risk Estimation: per the Revised Cardiac Risk Index (Circ. 100:1043, 1999), the patient's risk factors for cardiac complications include none, putting him in: RCI RISK CLASS I (0 risk factors, risk of major cardiac compl. appr. 0.5%)    Current medications which may produce withdrawal symptoms if withheld perioperatively: none.     1. Preoperative workup as follows ECG, hemoglobin, hematocrit, electrolytes, creatinine, liver function studies.  2. Change in medication regimen before surgery: none, continue medication regimen including morning of surgery, with sip of water.  3. Prophylaxis for cardiac events with perioperative beta-blockers: not indicated.  4. Invasive hemodynamic monitoring perioperatively: at the discretion of anesthesiologist.  5. Deep vein thrombosis prophylaxis postoperatively:regimen to be chosen by surgical team.    Medical clearance will be done pending review of results from preop visit at [facility] done 5/12/2025.     Yulia Alcantara, APRN-CNP  [facility]

## 2025-05-16 ENCOUNTER — DOCUMENTATION (OUTPATIENT)
Dept: PRIMARY CARE | Facility: CLINIC | Age: 55
End: 2025-05-16
Payer: COMMERCIAL

## 2025-05-16 NOTE — PROGRESS NOTES
Reviewed ECG and labs from [facility] on 5/13/2025. Patient is medically cleared for surgery on 6/3/2025. However, should patient's condition change at the time of surgery, decision to proceed should be made by the surgical team.     ROSI Mcconnell-CNP  [facility]

## 2025-05-19 DIAGNOSIS — G89.29 CHRONIC LOW BACK PAIN, UNSPECIFIED BACK PAIN LATERALITY, UNSPECIFIED WHETHER SCIATICA PRESENT: ICD-10-CM

## 2025-05-19 DIAGNOSIS — M54.50 CHRONIC LOW BACK PAIN, UNSPECIFIED BACK PAIN LATERALITY, UNSPECIFIED WHETHER SCIATICA PRESENT: ICD-10-CM

## 2025-05-19 RX ORDER — TOPIRAMATE 100 MG/1
100 TABLET, FILM COATED ORAL 2 TIMES DAILY
Qty: 180 TABLET | Refills: 1 | Status: SHIPPED | OUTPATIENT
Start: 2025-05-19

## 2025-05-27 DIAGNOSIS — E78.00 ELEVATED LDL CHOLESTEROL LEVEL: ICD-10-CM

## 2025-05-28 ENCOUNTER — PATIENT MESSAGE (OUTPATIENT)
Dept: PRIMARY CARE | Facility: CLINIC | Age: 55
End: 2025-05-28
Payer: COMMERCIAL

## 2025-05-28 DIAGNOSIS — E78.00 ELEVATED LDL CHOLESTEROL LEVEL: ICD-10-CM

## 2025-05-28 RX ORDER — ATORVASTATIN CALCIUM 40 MG/1
40 TABLET, FILM COATED ORAL NIGHTLY
Qty: 90 TABLET | Refills: 3 | Status: SHIPPED | OUTPATIENT
Start: 2025-05-28

## 2025-05-28 RX ORDER — ATORVASTATIN CALCIUM 40 MG/1
40 TABLET, FILM COATED ORAL NIGHTLY
Qty: 90 TABLET | Refills: 1 | OUTPATIENT
Start: 2025-05-28

## 2025-08-19 ENCOUNTER — APPOINTMENT (OUTPATIENT)
Dept: PRIMARY CARE | Facility: CLINIC | Age: 55
End: 2025-08-19
Payer: COMMERCIAL

## 2025-08-19 VITALS
TEMPERATURE: 97.4 F | BODY MASS INDEX: 44.52 KG/M2 | DIASTOLIC BLOOD PRESSURE: 71 MMHG | HEART RATE: 73 BPM | HEIGHT: 70 IN | SYSTOLIC BLOOD PRESSURE: 104 MMHG | OXYGEN SATURATION: 95 % | WEIGHT: 311 LBS

## 2025-08-19 DIAGNOSIS — Z12.5 ENCOUNTER FOR SCREENING FOR MALIGNANT NEOPLASM OF PROSTATE: ICD-10-CM

## 2025-08-19 DIAGNOSIS — E55.9 VITAMIN D DEFICIENCY: ICD-10-CM

## 2025-08-19 DIAGNOSIS — Z00.00 ENCOUNTER FOR ANNUAL GENERAL MEDICAL EXAMINATION WITHOUT ABNORMAL FINDINGS IN ADULT: Primary | ICD-10-CM

## 2025-08-19 DIAGNOSIS — E53.8 VITAMIN B 12 DEFICIENCY: ICD-10-CM

## 2025-08-19 DIAGNOSIS — I10 BENIGN ESSENTIAL HTN: ICD-10-CM

## 2025-08-19 DIAGNOSIS — I71.21 ANEURYSM OF ASCENDING AORTA WITHOUT RUPTURE: ICD-10-CM

## 2025-08-19 DIAGNOSIS — Z23 NEED FOR PNEUMOCOCCAL 20-VALENT CONJUGATE VACCINATION: ICD-10-CM

## 2025-08-19 DIAGNOSIS — R73.9 HYPERGLYCEMIA: ICD-10-CM

## 2025-08-19 DIAGNOSIS — E78.2 MIXED HYPERLIPIDEMIA: ICD-10-CM

## 2025-08-19 DIAGNOSIS — E66.813 CLASS 3 SEVERE OBESITY DUE TO EXCESS CALORIES WITH SERIOUS COMORBIDITY AND BODY MASS INDEX (BMI) OF 40.0 TO 44.9 IN ADULT: ICD-10-CM

## 2025-08-19 PROBLEM — R14.2 BURPING: Status: RESOLVED | Noted: 2024-02-15 | Resolved: 2025-08-19

## 2025-08-19 PROBLEM — R14.0 ABDOMINAL BLOATING: Status: RESOLVED | Noted: 2023-09-08 | Resolved: 2025-08-19

## 2025-08-19 PROBLEM — I25.10 CORONARY ARTERY DISEASE INVOLVING NATIVE CORONARY ARTERY OF NATIVE HEART WITHOUT ANGINA PECTORIS: Status: ACTIVE | Noted: 2023-09-07

## 2025-08-19 PROBLEM — K92.9 DISORDER OF DIGESTIVE TRACT: Status: RESOLVED | Noted: 2024-02-15 | Resolved: 2025-08-19

## 2025-08-19 PROBLEM — M16.12 PRIMARY LOCALIZED OSTEOARTHROSIS OF LEFT HIP: Status: RESOLVED | Noted: 2023-09-07 | Resolved: 2025-08-19

## 2025-08-19 PROBLEM — K80.20 CHOLELITHIASIS: Status: RESOLVED | Noted: 2018-06-13 | Resolved: 2025-08-19

## 2025-08-19 PROCEDURE — 3078F DIAST BP <80 MM HG: CPT | Performed by: FAMILY MEDICINE

## 2025-08-19 PROCEDURE — 90677 PCV20 VACCINE IM: CPT | Performed by: FAMILY MEDICINE

## 2025-08-19 PROCEDURE — 99396 PREV VISIT EST AGE 40-64: CPT | Performed by: FAMILY MEDICINE

## 2025-08-19 PROCEDURE — 90471 IMMUNIZATION ADMIN: CPT | Performed by: FAMILY MEDICINE

## 2025-08-19 PROCEDURE — 99214 OFFICE O/P EST MOD 30 MIN: CPT | Performed by: FAMILY MEDICINE

## 2025-08-19 PROCEDURE — 3008F BODY MASS INDEX DOCD: CPT | Performed by: FAMILY MEDICINE

## 2025-08-19 PROCEDURE — 3074F SYST BP LT 130 MM HG: CPT | Performed by: FAMILY MEDICINE

## 2025-08-19 ASSESSMENT — ENCOUNTER SYMPTOMS
OCCASIONAL FEELINGS OF UNSTEADINESS: 0
LOSS OF SENSATION IN FEET: 0
DEPRESSION: 0

## 2025-08-19 ASSESSMENT — PATIENT HEALTH QUESTIONNAIRE - PHQ9
1. LITTLE INTEREST OR PLEASURE IN DOING THINGS: NOT AT ALL
SUM OF ALL RESPONSES TO PHQ9 QUESTIONS 1 AND 2: 0
2. FEELING DOWN, DEPRESSED OR HOPELESS: NOT AT ALL

## 2025-08-22 LAB
25(OH)D3+25(OH)D2 SERPL-MCNC: 36 NG/ML (ref 30–100)
ALBUMIN SERPL-MCNC: 4.2 G/DL (ref 3.6–5.1)
ALP SERPL-CCNC: 142 U/L (ref 35–144)
ALT SERPL-CCNC: 21 U/L (ref 9–46)
ANION GAP SERPL CALCULATED.4IONS-SCNC: 11 MMOL/L (CALC) (ref 7–17)
AST SERPL-CCNC: 17 U/L (ref 10–35)
BASOPHILS # BLD AUTO: 41 CELLS/UL (ref 0–200)
BASOPHILS NFR BLD AUTO: 0.7 %
BILIRUB SERPL-MCNC: 0.7 MG/DL (ref 0.2–1.2)
BUN SERPL-MCNC: 17 MG/DL (ref 7–25)
CALCIUM SERPL-MCNC: 9 MG/DL (ref 8.6–10.3)
CHLORIDE SERPL-SCNC: 105 MMOL/L (ref 98–110)
CHOLEST SERPL-MCNC: 127 MG/DL
CHOLEST/HDLC SERPL: 2.4 (CALC)
CO2 SERPL-SCNC: 23 MMOL/L (ref 20–32)
CREAT SERPL-MCNC: 0.79 MG/DL (ref 0.7–1.3)
EGFRCR SERPLBLD CKD-EPI 2021: 106 ML/MIN/1.73M2
EOSINOPHIL # BLD AUTO: 232 CELLS/UL (ref 15–500)
EOSINOPHIL NFR BLD AUTO: 4 %
ERYTHROCYTE [DISTWIDTH] IN BLOOD BY AUTOMATED COUNT: 14 % (ref 11–15)
EST. AVERAGE GLUCOSE BLD GHB EST-MCNC: 114 MG/DL
EST. AVERAGE GLUCOSE BLD GHB EST-SCNC: 6.3 MMOL/L
GLUCOSE SERPL-MCNC: 114 MG/DL (ref 65–99)
HBA1C MFR BLD: 5.6 %
HCT VFR BLD AUTO: 40.3 % (ref 38.5–50)
HDLC SERPL-MCNC: 54 MG/DL
HGB BLD-MCNC: 13.3 G/DL (ref 13.2–17.1)
LDLC SERPL CALC-MCNC: 57 MG/DL (CALC)
LYMPHOCYTES # BLD AUTO: 1201 CELLS/UL (ref 850–3900)
LYMPHOCYTES NFR BLD AUTO: 20.7 %
MAGNESIUM SERPL-MCNC: 2 MG/DL (ref 1.5–2.5)
MCH RBC QN AUTO: 31.1 PG (ref 27–33)
MCHC RBC AUTO-ENTMCNC: 33 G/DL (ref 32–36)
MCV RBC AUTO: 94.4 FL (ref 80–100)
MONOCYTES # BLD AUTO: 638 CELLS/UL (ref 200–950)
MONOCYTES NFR BLD AUTO: 11 %
NEUTROPHILS # BLD AUTO: 3689 CELLS/UL (ref 1500–7800)
NEUTROPHILS NFR BLD AUTO: 63.6 %
NONHDLC SERPL-MCNC: 73 MG/DL (CALC)
PLATELET # BLD AUTO: 206 THOUSAND/UL (ref 140–400)
PMV BLD REES-ECKER: 10.4 FL (ref 7.5–12.5)
POTASSIUM SERPL-SCNC: 4 MMOL/L (ref 3.5–5.3)
PROT SERPL-MCNC: 7.1 G/DL (ref 6.1–8.1)
PSA SERPL-MCNC: 0.24 NG/ML
RBC # BLD AUTO: 4.27 MILLION/UL (ref 4.2–5.8)
SODIUM SERPL-SCNC: 139 MMOL/L (ref 135–146)
TRIGL SERPL-MCNC: 82 MG/DL
TSH SERPL-ACNC: 1.83 MIU/L (ref 0.4–4.5)
VIT B12 SERPL-MCNC: 319 PG/ML (ref 200–1100)
WBC # BLD AUTO: 5.8 THOUSAND/UL (ref 3.8–10.8)

## 2026-02-19 ENCOUNTER — APPOINTMENT (OUTPATIENT)
Dept: PRIMARY CARE | Facility: CLINIC | Age: 56
End: 2026-02-19
Payer: COMMERCIAL

## (undated) DEVICE — GOWN, SURGICAL, SIRUS, NON REINFORCED, LARGE

## (undated) DEVICE — Device

## (undated) DEVICE — DRESSING, ABDOMINAL, TENDERSORB, 8 X 7-1/2 IN, STERILE

## (undated) DEVICE — PADDING, UNDERCAST, WEBRIL, 4 IN X 4 YD, REG, NS

## (undated) DEVICE — TUBING, SUCTION, 6MM X 10, CLEAN N-COND

## (undated) DEVICE — TRAY, DRY PREP, PREMIUM

## (undated) DEVICE — BLANKET, LOWER BODY, VHA PLUS, ADULT

## (undated) DEVICE — STRIP, SKIN CLOSURE, STERI STRIP, REINFORCED, 0.5 X 4 IN

## (undated) DEVICE — SUTURE, VICRYL, 4-0, 18 IN, PS-4, UNDYED

## (undated) DEVICE — PADDING, UNDERCAST, WEBRIL, 3 IN X 4 YD, REG, NS

## (undated) DEVICE — SUTURE, ETHIBON, XTRA 2 LR LR, 30"

## (undated) DEVICE — GLOVE, SURGICAL, PROTEXIS PI , 8.0, PF, LF

## (undated) DEVICE — SOLUTION, IRRIGATION, X RX SODIUM CHL 0.9%, 1000ML BTL

## (undated) DEVICE — GLOVE, SURGICAL, PROTEXIS PI , 7.5, PF, LF